# Patient Record
Sex: MALE | Race: WHITE | Employment: UNEMPLOYED | URBAN - METROPOLITAN AREA
[De-identification: names, ages, dates, MRNs, and addresses within clinical notes are randomized per-mention and may not be internally consistent; named-entity substitution may affect disease eponyms.]

---

## 2017-01-04 ENCOUNTER — TELEPHONE (OUTPATIENT)
Dept: ORTHOPEDIC SURGERY | Age: 42
End: 2017-01-04

## 2017-02-02 ENCOUNTER — HOSPITAL ENCOUNTER (OUTPATIENT)
Dept: MRI IMAGING | Age: 42
Discharge: HOME | End: 2017-02-02
Attending: NURSE PRACTITIONER | Admitting: NURSE PRACTITIONER

## 2017-02-02 DIAGNOSIS — R51.9 HEADACHE: ICD-10-CM

## 2017-02-02 DIAGNOSIS — R51.9 WORSENING HEADACHES: ICD-10-CM

## 2017-05-24 ENCOUNTER — OFFICE VISIT (OUTPATIENT)
Dept: URGENT CARE | Age: 42
End: 2017-05-24

## 2017-05-24 VITALS
SYSTOLIC BLOOD PRESSURE: 150 MMHG | HEART RATE: 64 BPM | BODY MASS INDEX: 28 KG/M2 | RESPIRATION RATE: 16 BRPM | HEIGHT: 71 IN | DIASTOLIC BLOOD PRESSURE: 108 MMHG | TEMPERATURE: 97.5 F | WEIGHT: 200 LBS

## 2017-05-24 DIAGNOSIS — Z76.5 DRUG-SEEKING BEHAVIOR: ICD-10-CM

## 2017-05-24 DIAGNOSIS — M25.511 ACUTE PAIN OF RIGHT SHOULDER: Primary | ICD-10-CM

## 2017-05-24 PROCEDURE — 99214 OFFICE O/P EST MOD 30 MIN: CPT | Performed by: EMERGENCY MEDICINE

## 2017-05-24 ASSESSMENT — ENCOUNTER SYMPTOMS: COLOR CHANGE: 0

## 2017-09-09 ENCOUNTER — HOSPITAL ENCOUNTER (OUTPATIENT)
Dept: OTHER | Age: 42
Discharge: HOME | End: 2017-09-09
Attending: NURSE PRACTITIONER | Admitting: NURSE PRACTITIONER

## 2017-09-09 DIAGNOSIS — S99.922A INJURY OF FOOT, LEFT, INITIAL ENCOUNTER: ICD-10-CM

## 2017-12-06 ENCOUNTER — OFFICE VISIT (OUTPATIENT)
Dept: ORTHOPEDIC SURGERY | Age: 42
End: 2017-12-06

## 2017-12-06 VITALS
WEIGHT: 184.97 LBS | DIASTOLIC BLOOD PRESSURE: 88 MMHG | BODY MASS INDEX: 26.48 KG/M2 | HEART RATE: 110 BPM | SYSTOLIC BLOOD PRESSURE: 117 MMHG | HEIGHT: 70 IN

## 2017-12-06 DIAGNOSIS — S62.212D CLOSED BENNETT'S FRACTURE OF LEFT THUMB WITH ROUTINE HEALING: ICD-10-CM

## 2017-12-06 PROCEDURE — 99214 OFFICE O/P EST MOD 30 MIN: CPT | Performed by: ORTHOPAEDIC SURGERY

## 2017-12-06 PROCEDURE — G8419 CALC BMI OUT NRM PARAM NOF/U: HCPCS | Performed by: ORTHOPAEDIC SURGERY

## 2017-12-06 PROCEDURE — G8484 FLU IMMUNIZE NO ADMIN: HCPCS | Performed by: ORTHOPAEDIC SURGERY

## 2017-12-06 PROCEDURE — G8427 DOCREV CUR MEDS BY ELIG CLIN: HCPCS | Performed by: ORTHOPAEDIC SURGERY

## 2017-12-06 PROCEDURE — 4004F PT TOBACCO SCREEN RCVD TLK: CPT | Performed by: ORTHOPAEDIC SURGERY

## 2017-12-06 NOTE — LETTER
[place] [address]  Phone: [phone]  Fax: [phone]    Richy Singh MD        December 6, 2017     Patient: Erika Franklin   YOB: 1975   Date of Visit: 12/6/2017       To Whom It May Concern: It is my medical opinion that Renate Myles may return to light duty immediately with the following restrictions: no use of left hand for 6 weeks. If you have any questions or concerns, please don't hesitate to call.     Sincerely,        Richy Singh MD

## 2017-12-07 ENCOUNTER — TELEPHONE (OUTPATIENT)
Dept: ORTHOPEDIC SURGERY | Age: 42
End: 2017-12-07

## 2017-12-12 ENCOUNTER — HOSPITAL ENCOUNTER (OUTPATIENT)
Dept: SURGERY | Age: 42
Discharge: HOME | End: 2017-12-12
Attending: ORTHOPAEDIC SURGERY | Admitting: ORTHOPAEDIC SURGERY

## 2017-12-12 VITALS
HEART RATE: 59 BPM | OXYGEN SATURATION: 97 % | DIASTOLIC BLOOD PRESSURE: 96 MMHG | WEIGHT: 184 LBS | BODY MASS INDEX: 26.34 KG/M2 | TEMPERATURE: 97.2 F | HEIGHT: 70 IN | RESPIRATION RATE: 16 BRPM | SYSTOLIC BLOOD PRESSURE: 175 MMHG

## 2017-12-12 RX ORDER — LABETALOL HYDROCHLORIDE 5 MG/ML
5 INJECTION, SOLUTION INTRAVENOUS EVERY 10 MIN PRN
Status: DISCONTINUED | OUTPATIENT
Start: 2017-12-12 | End: 2017-12-13 | Stop reason: HOSPADM

## 2017-12-12 RX ORDER — HYDRALAZINE HYDROCHLORIDE 20 MG/ML
5 INJECTION INTRAMUSCULAR; INTRAVENOUS EVERY 10 MIN PRN
Status: DISCONTINUED | OUTPATIENT
Start: 2017-12-12 | End: 2017-12-13 | Stop reason: HOSPADM

## 2017-12-12 RX ORDER — OXYCODONE HYDROCHLORIDE AND ACETAMINOPHEN 5; 325 MG/1; MG/1
TABLET ORAL
Status: DISCONTINUED
Start: 2017-12-12 | End: 2017-12-13 | Stop reason: HOSPADM

## 2017-12-12 RX ORDER — SODIUM CHLORIDE, SODIUM LACTATE, POTASSIUM CHLORIDE, CALCIUM CHLORIDE 600; 310; 30; 20 MG/100ML; MG/100ML; MG/100ML; MG/100ML
INJECTION, SOLUTION INTRAVENOUS CONTINUOUS
Status: DISCONTINUED | OUTPATIENT
Start: 2017-12-12 | End: 2017-12-13 | Stop reason: HOSPADM

## 2017-12-12 RX ORDER — OXYCODONE HYDROCHLORIDE AND ACETAMINOPHEN 5; 325 MG/1; MG/1
1 TABLET ORAL ONCE
Status: COMPLETED | OUTPATIENT
Start: 2017-12-12 | End: 2017-12-12

## 2017-12-12 RX ORDER — ONDANSETRON 2 MG/ML
4 INJECTION INTRAMUSCULAR; INTRAVENOUS EVERY 10 MIN PRN
Status: DISCONTINUED | OUTPATIENT
Start: 2017-12-12 | End: 2017-12-13 | Stop reason: HOSPADM

## 2017-12-12 RX ORDER — MEPERIDINE HYDROCHLORIDE 50 MG/ML
12.5 INJECTION INTRAMUSCULAR; INTRAVENOUS; SUBCUTANEOUS EVERY 5 MIN PRN
Status: DISCONTINUED | OUTPATIENT
Start: 2017-12-12 | End: 2017-12-13 | Stop reason: HOSPADM

## 2017-12-12 RX ADMIN — OXYCODONE HYDROCHLORIDE AND ACETAMINOPHEN 1 TABLET: 5; 325 TABLET ORAL at 14:06

## 2017-12-12 ASSESSMENT — PAIN DESCRIPTION - ORIENTATION: ORIENTATION: LEFT

## 2017-12-12 ASSESSMENT — PAIN SCALES - GENERAL
PAINLEVEL_OUTOF10: 5
PAINLEVEL_OUTOF10: 0
PAINLEVEL_OUTOF10: 5

## 2017-12-12 ASSESSMENT — PAIN DESCRIPTION - LOCATION: LOCATION: HAND

## 2017-12-12 NOTE — BRIEF OP NOTE
Brief Postoperative Note    Don Dixon  YOB: 1975  [id number]    Pre-operative Diagnosis: left thumb bennet's fracture    Post-operative Diagnosis: Same    Procedure:  ORIF same  X ray    Anesthesia: General    Surgeons/Assistants: porsha    Estimated Blood Loss: less than 50     Complications: None    Specimens: Was Not Obtained    Findings: as    Electronically signed by Nikkie Grace MD on 12/12/2017 at 3:02 PM

## 2017-12-12 NOTE — ANESTHESIA POST-OP
Postoperative Anesthesia Note    Name:    Pardeep Winter  MRN:      [id number]    Patient Vitals for the past 12 hrs:   BP Temp Temp src Pulse Resp SpO2 Height Weight   12/12/17 1412 (!) 175/96 97.2 °F (36.2 °C) Temporal 59 16 97 % - -   12/12/17 1402 (!) 151/104 - - 64 12 100 % - -   12/12/17 1347 (!) 131/90 97.1 °F (36.2 °C) Temporal 64 12 100 % - -   12/12/17 1342 (!) 144/31 - - 66 14 100 % - -   12/12/17 1337 (!) 140/94 - - 58 12 100 % - -   12/12/17 1332 126/79 97.4 °F (36.3 °C) Temporal 59 12 97 % - -   12/12/17 1103 (!) 144/93 97 °F (36.1 °C) Temporal 60 18 97 % 5' 10\" (1.778 m) 184 lb (83.5 kg)        LABS:    CBC  Lab Results   Component Value Date/Time    WBC 13.5 (H) 01/24/2017 04:00 PM    HGB 17.4 01/24/2017 04:00 PM    HCT 51.7 01/24/2017 04:00 PM     01/24/2017 04:00 PM     RENAL  Lab Results   Component Value Date/Time     01/24/2017 04:00 PM    K 3.9 01/24/2017 04:00 PM     01/24/2017 04:00 PM    CO2 26 01/24/2017 04:00 PM    BUN 11 01/24/2017 04:00 PM    CREATININE 0.7 (L) 01/24/2017 04:00 PM    GLUCOSE 89 01/24/2017 04:00 PM     COAGS  No results found for: PROTIME, INR, APTT    Intake & Output: In: 1200 [P.O.:200; I.V.:1000]  Out: -     Nausea & Vomiting:  No    Level of Consciousness:  Awake    Pain Assessment:  Adequate analgesia    Anesthesia Complications:  No apparent anesthetic complications    SUMMARY      Vital signs stable  OK to discharge from Stage I post anesthesia care.   Care transferred from Anesthesiology department on discharge from perioperative area

## 2017-12-12 NOTE — PROGRESS NOTES
Becoming more agitated-c/o pain left hand-medicated, wants to be discharged-taking fluids and crackers-

## 2017-12-13 NOTE — OP NOTE
[facility]                   NichoSergio nascimento                                  OPERATIVE REPORT    PATIENT NAME: Shawn Clements                      :        1975  MED REC NO:   [id number]                          ROOM:  ACCOUNT NO:   [de-identified]                          ADMIT DATE: 2017  PROVIDER:     Jeri Black MD    DATE OF PROCEDURE:  2017    PREOPERATIVE DIAGNOSIS:  Displaced Bosch's fracture of the left thumb. POSTOPERATIVE DIAGNOSIS:  Displaced Bosch's fracture of the left thumb. OPERATION PERFORMED:  1. Open reduction and internal fixation of Bosch's fracture, left thumb. 2.  Interpretation intraoperative PA, lateral, and oblique x-rays of the  hand. SURGEON:  Jeri Black MD    ANESTHESIA:  General.    COMPLICATIONS:  None. INDICATIONS FOR PROCEDURE:  The patient presented with a slightly displaced  Bosch's fracture of the right thumb. DESCRIPTION OF PROCEDURE:  The patient was placed on the operating table in  the supine position and general anesthetic was placed. The arm was then  prepped with ChloraPrep and draped in a sterile fashion. The arm was  exsanguinated using an Ace bandage and a well-padded tourniquet inflated  about the upper arm to 250 mmHg. A T-shaped incision was then made over  the thumb metacarpal and carpometacarpal joint. Dissection was carried  down carefully protecting branches of the radial sensory nerve. The  extensor tendons were then retracted and the periosteum of the metacarpal  was opened as was the joint capsule on the radial side of the  carpometacarpal joint. Using a combination of longitudinal traction and a  [place] elevator through the fracture site _____ the impacted fragment back  down to a normal position in relation to the shaft of the metacarpal, we  were able to achieve good reduction of the articular surface.   This was  then temporarily

## 2017-12-20 ENCOUNTER — OFFICE VISIT (OUTPATIENT)
Dept: ORTHOPEDIC SURGERY | Age: 42
End: 2017-12-20

## 2017-12-20 ENCOUNTER — HOSPITAL ENCOUNTER (OUTPATIENT)
Dept: OCCUPATIONAL THERAPY | Age: 42
Discharge: HOME | End: 2017-12-31
Admitting: ORTHOPAEDIC SURGERY

## 2017-12-20 DIAGNOSIS — S62.212D CLOSED BENNETT'S FRACTURE OF LEFT THUMB WITH ROUTINE HEALING: Primary | ICD-10-CM

## 2017-12-20 PROCEDURE — 99024 POSTOP FOLLOW-UP VISIT: CPT | Performed by: ORTHOPAEDIC SURGERY

## 2017-12-20 RX ORDER — OXYCODONE HYDROCHLORIDE AND ACETAMINOPHEN 5; 325 MG/1; MG/1
1 TABLET ORAL EVERY 8 HOURS PRN
Qty: 20 TABLET | Refills: 0 | Status: SHIPPED | OUTPATIENT
Start: 2017-12-20 | End: 2017-12-27

## 2017-12-20 NOTE — PLAN OF CARE
equipment, currently off work due to thumb injury  Progress of any previous OT/PT: the patient []has/ [x]has not received OT/PT previously for this diagnosis. Pain: 8/10    Objective Findings as appropriate:  ROM, strength, edema, wound/ scar appearance, function:   Sutures intact over dorsum of left thumb, mild swelling and bruising  Type of splint:   Wrist and thumb spica left hand  Splint protocol utilization:   Full time except for hygiene  Splint Purpose: [x]Immobilize or protect [x]Promote healing of fracture   []Relieve pain  []Provide support for improved hand function []Maximize joint motion    Treatment:   [x]Splint provided ([x]Customized/ []Prefabricated), and splint rationale explained. [x]Patient instructed in [x]wear/ [x]care of splint and educated regarding diagnosis. []Patient instructed in symptom reduction techniques   []HEP instruction    []Discussed ADL assistive device    Written Information Distributed: []HEP  [x]Splint care and wearing protocol    Patient response to evaluation and instructions:  [x]Attentive/interested   [x]Asked questions/ retained info  []Appeared disinterested  []Poor retention of information  []Appeared anxious/ fearful    Assessment and Plan:  Goals: [x]Patient will be able to verbalize rationale for, and demonstrate proper wearing     of splint. [x]Splint will provide proper fit and function. []Patient will be able to verbalize 2-3 ways to prevent further symptoms. [x]Patient will be able to don and doff independently. []Patient will be independent with HEP    Goals met:  [x]yes []no    Plan:  [x]Splint completed with good fit and function. Hand Therapy to follow up for     splint modifications as needed    []Splint completed; OT/PT evaluation initiated. Patient to return for further     treatment.     Robert Colon , [address], [address] [facility]

## 2017-12-20 NOTE — PROGRESS NOTES
Assessment: Anatomic realignment Bosch's fracture left thumb. 1st postop visit    Treatment Plan: Sutures removed today. Patient may start gentle range of motion exercises but no resistance    Return in about 1 month (around 1/20/2018) for X-ray next visit. History of Present Illness  Rashad Lopez is a 36 y.o. male. 1st postop visit    Review of Systems  Complete Review of Systems performed and is non-contributory except for what is noted in HPI. Vital Signs  There were no vitals filed for this visit. There is no height or weight on file to calculate BMI. Physical Exam  Constitutional:  Patient is well-nourished and demonstrates normal hygiene. Mental Status:  Patient is alert and oriented to person, place and time. Skin:  Intact, no rashes or lesions. Hand Examination: Wounds healed nicely sutures removed today.     Additional Comments:     Additional Examinations:  X-Ray Findings:  PA lateral and oblique x-rays of the left thumb show anatomic realignment of the Bosch's fracture all hardware is in appropriate position  Additional Diagnostic Test Findings:    Office Procedures:    Orders Placed This Encounter   Procedures    XR FINGER LEFT (MIN 2 VIEWS)     85031     Order Specific Question:   Reason for exam:     Answer:   Pain    [facility]     Referral Priority:   Routine     Referral Type:   Eval and Treat     Referral Reason:   Specialty Services Required     Requested Specialty:   Occupational Therapy     Number of Visits Requested:   1

## 2017-12-22 ENCOUNTER — TELEPHONE (OUTPATIENT)
Dept: ORTHOPEDIC SURGERY | Age: 42
End: 2017-12-22

## 2018-01-01 ENCOUNTER — HOSPITAL ENCOUNTER (OUTPATIENT)
Dept: OCCUPATIONAL THERAPY | Age: 43
Discharge: HOME | End: 2018-01-31
Attending: ORTHOPAEDIC SURGERY | Admitting: ORTHOPAEDIC SURGERY

## 2018-01-24 ENCOUNTER — OFFICE VISIT (OUTPATIENT)
Dept: ORTHOPEDIC SURGERY | Age: 43
End: 2018-01-24

## 2018-01-24 DIAGNOSIS — S62.212D CLOSED BENNETT'S FRACTURE OF LEFT THUMB WITH ROUTINE HEALING: Primary | ICD-10-CM

## 2018-01-24 PROCEDURE — 99024 POSTOP FOLLOW-UP VISIT: CPT | Performed by: ORTHOPAEDIC SURGERY

## 2018-01-24 NOTE — PROGRESS NOTES
Assessment: Healing Bosch's fracture of the left thumb. Treatment Plan: Patient had already discontinued his own splint several weeks ago and is back to pretty much normal activities. Return if symptoms worsen or fail to improve. History of Present Illness  Catrachita Siu is a 36 y.o. male. Patient returns today 6 weeks post open reduction internal fixation of displaced Bosch's fracture. He states about 2 weeks ago he completely stopped wearing his splint and his thumb feels good. Review of Systems  Complete Review of Systems performed and is non-contributory except for what is noted in HPI. Vital Signs  There were no vitals filed for this visit. There is no height or weight on file to calculate BMI. Physical Exam  Constitutional:  Patient is well-nourished and demonstrates normal hygiene. Mental Status:  Patient is alert and oriented to person, place and time. Skin:  Intact, no rashes or lesions. Hand Examination: He has full range of motion of the thumb minimal tenderness to direct palpation over his hardware. Additional Comments:     Additional Examinations:  X-Ray Findings:  PA lateral and oblique x-rays of the left thumb show congruent alignment of the articular surface. All hardware remains in good position without evidence of loosening.   Additional Diagnostic Test Findings:    Office Procedures:    Orders Placed This Encounter   Procedures    XR FINGER LEFT (MIN 2 VIEWS)     70565     Order Specific Question:   Reason for exam:     Answer:   Pain

## 2018-06-15 ENCOUNTER — TELEPHONE (OUTPATIENT)
Dept: ORTHOPEDIC SURGERY | Age: 43
End: 2018-06-15

## 2019-01-24 ENCOUNTER — APPOINTMENT (OUTPATIENT)
Dept: GENERAL RADIOLOGY | Age: 44
End: 2019-01-24

## 2019-01-24 ENCOUNTER — HOSPITAL ENCOUNTER (EMERGENCY)
Age: 44
Discharge: LEFT BEFORE DISCHARGE | End: 2019-01-24
Attending: EMERGENCY MEDICINE

## 2019-01-24 VITALS
SYSTOLIC BLOOD PRESSURE: 170 MMHG | HEART RATE: 103 BPM | TEMPERATURE: 97.4 F | WEIGHT: 175 LBS | DIASTOLIC BLOOD PRESSURE: 115 MMHG | RESPIRATION RATE: 16 BRPM | BODY MASS INDEX: 26.52 KG/M2 | OXYGEN SATURATION: 100 % | HEIGHT: 68 IN

## 2019-01-24 DIAGNOSIS — R06.00 DYSPNEA, UNSPECIFIED TYPE: Primary | ICD-10-CM

## 2019-01-24 LAB
A/G RATIO: 1.4 (ref 1.1–2.2)
ACETAMINOPHEN LEVEL: <5 UG/ML (ref 10–30)
ALBUMIN SERPL-MCNC: 4.2 G/DL (ref 3.4–5)
ALP BLD-CCNC: 87 U/L (ref 40–129)
ALT SERPL-CCNC: 25 U/L (ref 10–40)
ANION GAP SERPL CALCULATED.3IONS-SCNC: 14 MMOL/L (ref 3–16)
AST SERPL-CCNC: 22 U/L (ref 15–37)
BASOPHILS ABSOLUTE: 0 K/UL (ref 0–0.2)
BASOPHILS RELATIVE PERCENT: 0.3 %
BILIRUB SERPL-MCNC: 0.5 MG/DL (ref 0–1)
BUN BLDV-MCNC: 9 MG/DL (ref 7–20)
CALCIUM SERPL-MCNC: 9.7 MG/DL (ref 8.3–10.6)
CHLORIDE BLD-SCNC: 98 MMOL/L (ref 99–110)
CO2: 27 MMOL/L (ref 21–32)
CREAT SERPL-MCNC: 0.9 MG/DL (ref 0.9–1.3)
EKG ATRIAL RATE: 105 BPM
EKG DIAGNOSIS: NORMAL
EKG P AXIS: 65 DEGREES
EKG P-R INTERVAL: 176 MS
EKG Q-T INTERVAL: 344 MS
EKG QRS DURATION: 90 MS
EKG QTC CALCULATION (BAZETT): 454 MS
EKG R AXIS: 29 DEGREES
EKG T AXIS: 46 DEGREES
EKG VENTRICULAR RATE: 105 BPM
EOSINOPHILS ABSOLUTE: 0.1 K/UL (ref 0–0.6)
EOSINOPHILS RELATIVE PERCENT: 0.8 %
ETHANOL: NORMAL MG/DL (ref 0–0.08)
GFR AFRICAN AMERICAN: >60
GFR NON-AFRICAN AMERICAN: >60
GLOBULIN: 2.9 G/DL
GLUCOSE BLD-MCNC: 120 MG/DL (ref 70–99)
HCT VFR BLD CALC: 49.4 % (ref 40.5–52.5)
HEMOGLOBIN: 17.1 G/DL (ref 13.5–17.5)
LYMPHOCYTES ABSOLUTE: 1.9 K/UL (ref 1–5.1)
LYMPHOCYTES RELATIVE PERCENT: 14.5 %
MCH RBC QN AUTO: 32.1 PG (ref 26–34)
MCHC RBC AUTO-ENTMCNC: 34.5 G/DL (ref 31–36)
MCV RBC AUTO: 92.9 FL (ref 80–100)
MONOCYTES ABSOLUTE: 1.1 K/UL (ref 0–1.3)
MONOCYTES RELATIVE PERCENT: 8 %
NEUTROPHILS ABSOLUTE: 10.1 K/UL (ref 1.7–7.7)
NEUTROPHILS RELATIVE PERCENT: 76.4 %
PDW BLD-RTO: 13.4 % (ref 12.4–15.4)
PLATELET # BLD: 253 K/UL (ref 135–450)
PMV BLD AUTO: 8.7 FL (ref 5–10.5)
POTASSIUM SERPL-SCNC: 3.7 MMOL/L (ref 3.5–5.1)
RBC # BLD: 5.32 M/UL (ref 4.2–5.9)
SALICYLATE, SERUM: <0.3 MG/DL (ref 15–30)
SODIUM BLD-SCNC: 139 MMOL/L (ref 136–145)
TOTAL PROTEIN: 7.1 G/DL (ref 6.4–8.2)
TROPONIN: <0.01 NG/ML
WBC # BLD: 13.2 K/UL (ref 4–11)

## 2019-01-24 PROCEDURE — 85025 COMPLETE CBC W/AUTO DIFF WBC: CPT

## 2019-01-24 PROCEDURE — 71045 X-RAY EXAM CHEST 1 VIEW: CPT

## 2019-01-24 PROCEDURE — 93005 ELECTROCARDIOGRAM TRACING: CPT | Performed by: EMERGENCY MEDICINE

## 2019-01-24 PROCEDURE — 84484 ASSAY OF TROPONIN QUANT: CPT

## 2019-01-24 PROCEDURE — 99285 EMERGENCY DEPT VISIT HI MDM: CPT

## 2019-01-24 PROCEDURE — 93010 ELECTROCARDIOGRAM REPORT: CPT | Performed by: INTERNAL MEDICINE

## 2019-01-24 PROCEDURE — G0480 DRUG TEST DEF 1-7 CLASSES: HCPCS

## 2019-01-24 PROCEDURE — 80053 COMPREHEN METABOLIC PANEL: CPT

## 2019-01-24 ASSESSMENT — PAIN DESCRIPTION - DESCRIPTORS: DESCRIPTORS: PRESSURE

## 2019-01-24 ASSESSMENT — PAIN SCALES - GENERAL: PAINLEVEL_OUTOF10: 4

## 2019-01-24 ASSESSMENT — PAIN DESCRIPTION - PAIN TYPE: TYPE: ACUTE PAIN

## 2019-01-24 ASSESSMENT — PAIN DESCRIPTION - LOCATION: LOCATION: CHEST

## 2019-03-23 ENCOUNTER — HOSPITAL ENCOUNTER (EMERGENCY)
Age: 44
Discharge: HOME | End: 2019-03-23
Attending: EMERGENCY MEDICINE

## 2019-03-23 VITALS
SYSTOLIC BLOOD PRESSURE: 160 MMHG | HEIGHT: 70 IN | DIASTOLIC BLOOD PRESSURE: 96 MMHG | TEMPERATURE: 97.1 F | BODY MASS INDEX: 25.05 KG/M2 | OXYGEN SATURATION: 98 % | WEIGHT: 175 LBS | HEART RATE: 96 BPM | RESPIRATION RATE: 17 BRPM

## 2019-03-23 DIAGNOSIS — F15.10 METHAMPHETAMINE ABUSE (HCC): Primary | ICD-10-CM

## 2019-03-23 LAB
AMPHETAMINE SCREEN, URINE: POSITIVE
BARBITURATE SCREEN URINE: ABNORMAL
BENZODIAZEPINE SCREEN, URINE: ABNORMAL
CANNABINOID SCREEN URINE: POSITIVE
COCAINE METABOLITE SCREEN URINE: ABNORMAL
Lab: ABNORMAL
METHADONE SCREEN, URINE: ABNORMAL
OPIATE SCREEN URINE: ABNORMAL
OXYCODONE URINE: ABNORMAL
PH UA: 6
PHENCYCLIDINE SCREEN URINE: ABNORMAL
PROPOXYPHENE SCREEN: ABNORMAL

## 2019-03-23 PROCEDURE — 99285 EMERGENCY DEPT VISIT HI MDM: CPT

## 2019-03-23 PROCEDURE — 80307 DRUG TEST PRSMV CHEM ANLYZR: CPT

## 2019-03-23 PROCEDURE — 6370000000 HC RX 637 (ALT 250 FOR IP): Performed by: EMERGENCY MEDICINE

## 2019-03-23 RX ORDER — OLANZAPINE 5 MG/1
10 TABLET, ORALLY DISINTEGRATING ORAL ONCE
Status: COMPLETED | OUTPATIENT
Start: 2019-03-23 | End: 2019-03-23

## 2019-03-23 RX ORDER — OLANZAPINE 5 MG/1
10 TABLET, ORALLY DISINTEGRATING ORAL NIGHTLY
Status: DISCONTINUED | OUTPATIENT
Start: 2019-03-23 | End: 2019-03-23

## 2019-03-23 RX ORDER — OLANZAPINE 10 MG/1
10 TABLET, ORALLY DISINTEGRATING ORAL NIGHTLY
Qty: 30 TABLET | Refills: 0 | Status: SHIPPED | OUTPATIENT
Start: 2019-03-23 | End: 2019-11-09

## 2019-03-23 RX ADMIN — OLANZAPINE 10 MG: 5 TABLET, ORALLY DISINTEGRATING ORAL at 23:37

## 2019-03-23 ASSESSMENT — PAIN SCALES - GENERAL: PAINLEVEL_OUTOF10: 3

## 2019-11-09 ENCOUNTER — HOSPITAL ENCOUNTER (EMERGENCY)
Age: 44
Discharge: HOME | End: 2019-11-10
Attending: EMERGENCY MEDICINE
Payer: MEDICAID

## 2019-11-09 DIAGNOSIS — F22 PARANOIA (HCC): ICD-10-CM

## 2019-11-09 DIAGNOSIS — F19.10 SUBSTANCE ABUSE (HCC): Primary | ICD-10-CM

## 2019-11-09 LAB
A/G RATIO: 1.5 (ref 1.1–2.2)
ALBUMIN SERPL-MCNC: 4.4 G/DL (ref 3.4–5)
ALP BLD-CCNC: 77 U/L (ref 40–129)
ALT SERPL-CCNC: 23 U/L (ref 10–40)
ANION GAP SERPL CALCULATED.3IONS-SCNC: 16 MMOL/L (ref 3–16)
AST SERPL-CCNC: 34 U/L (ref 15–37)
BASOPHILS ABSOLUTE: 0.1 K/UL (ref 0–0.2)
BASOPHILS RELATIVE PERCENT: 0.5 %
BILIRUB SERPL-MCNC: 1.1 MG/DL (ref 0–1)
BUN BLDV-MCNC: 16 MG/DL (ref 7–20)
CALCIUM SERPL-MCNC: 9.8 MG/DL (ref 8.3–10.6)
CHLORIDE BLD-SCNC: 96 MMOL/L (ref 99–110)
CO2: 28 MMOL/L (ref 21–32)
CREAT SERPL-MCNC: 0.9 MG/DL (ref 0.9–1.3)
EOSINOPHILS ABSOLUTE: 0 K/UL (ref 0–0.6)
EOSINOPHILS RELATIVE PERCENT: 0.1 %
GFR AFRICAN AMERICAN: >60
GFR NON-AFRICAN AMERICAN: >60
GLOBULIN: 2.9 G/DL
GLUCOSE BLD-MCNC: 86 MG/DL (ref 70–99)
HCT VFR BLD CALC: 48.1 % (ref 40.5–52.5)
HEMOGLOBIN: 16.4 G/DL (ref 13.5–17.5)
LIPASE: 12 U/L (ref 13–60)
LYMPHOCYTES ABSOLUTE: 1.6 K/UL (ref 1–5.1)
LYMPHOCYTES RELATIVE PERCENT: 7.8 %
MCH RBC QN AUTO: 32.1 PG (ref 26–34)
MCHC RBC AUTO-ENTMCNC: 34.2 G/DL (ref 31–36)
MCV RBC AUTO: 93.9 FL (ref 80–100)
MONOCYTES ABSOLUTE: 1.3 K/UL (ref 0–1.3)
MONOCYTES RELATIVE PERCENT: 6.5 %
NEUTROPHILS ABSOLUTE: 17.8 K/UL (ref 1.7–7.7)
NEUTROPHILS RELATIVE PERCENT: 85.1 %
PDW BLD-RTO: 12.9 % (ref 12.4–15.4)
PLATELET # BLD: 235 K/UL (ref 135–450)
PMV BLD AUTO: 9.1 FL (ref 5–10.5)
POTASSIUM REFLEX MAGNESIUM: 5 MMOL/L (ref 3.5–5.1)
RBC # BLD: 5.13 M/UL (ref 4.2–5.9)
SODIUM BLD-SCNC: 140 MMOL/L (ref 136–145)
TOTAL PROTEIN: 7.3 G/DL (ref 6.4–8.2)
WBC # BLD: 20.9 K/UL (ref 4–11)

## 2019-11-09 PROCEDURE — G0480 DRUG TEST DEF 1-7 CLASSES: HCPCS

## 2019-11-09 PROCEDURE — 80307 DRUG TEST PRSMV CHEM ANLYZR: CPT

## 2019-11-09 PROCEDURE — 80053 COMPREHEN METABOLIC PANEL: CPT

## 2019-11-09 PROCEDURE — 85025 COMPLETE CBC W/AUTO DIFF WBC: CPT

## 2019-11-09 PROCEDURE — 81001 URINALYSIS AUTO W/SCOPE: CPT

## 2019-11-09 PROCEDURE — 83690 ASSAY OF LIPASE: CPT

## 2019-11-09 PROCEDURE — 93005 ELECTROCARDIOGRAM TRACING: CPT | Performed by: EMERGENCY MEDICINE

## 2019-11-09 PROCEDURE — 99285 EMERGENCY DEPT VISIT HI MDM: CPT

## 2019-11-10 ENCOUNTER — APPOINTMENT (OUTPATIENT)
Dept: GENERAL RADIOLOGY | Age: 44
End: 2019-11-10
Payer: MEDICAID

## 2019-11-10 VITALS
TEMPERATURE: 98.6 F | BODY MASS INDEX: 24.25 KG/M2 | HEIGHT: 68 IN | SYSTOLIC BLOOD PRESSURE: 118 MMHG | OXYGEN SATURATION: 98 % | RESPIRATION RATE: 18 BRPM | WEIGHT: 160 LBS | DIASTOLIC BLOOD PRESSURE: 74 MMHG | HEART RATE: 87 BPM

## 2019-11-10 LAB
ACETAMINOPHEN LEVEL: <5 UG/ML (ref 10–30)
AMPHETAMINE SCREEN, URINE: POSITIVE
BACTERIA: ABNORMAL /HPF
BARBITURATE SCREEN URINE: ABNORMAL
BENZODIAZEPINE SCREEN, URINE: ABNORMAL
BILIRUBIN URINE: ABNORMAL
BLOOD, URINE: NEGATIVE
CANNABINOID SCREEN URINE: ABNORMAL
CLARITY: CLEAR
COCAINE METABOLITE SCREEN URINE: ABNORMAL
COLOR: YELLOW
EKG ATRIAL RATE: 82 BPM
EKG DIAGNOSIS: NORMAL
EKG P AXIS: 70 DEGREES
EKG P-R INTERVAL: 144 MS
EKG Q-T INTERVAL: 390 MS
EKG QRS DURATION: 90 MS
EKG QTC CALCULATION (BAZETT): 455 MS
EKG R AXIS: 18 DEGREES
EKG T AXIS: 29 DEGREES
EKG VENTRICULAR RATE: 82 BPM
ETHANOL: NORMAL MG/DL (ref 0–0.08)
GLUCOSE URINE: NEGATIVE MG/DL
KETONES, URINE: 40 MG/DL
LEUKOCYTE ESTERASE, URINE: NEGATIVE
Lab: ABNORMAL
METHADONE SCREEN, URINE: ABNORMAL
MICROSCOPIC EXAMINATION: YES
MUCUS: ABNORMAL /LPF
NITRITE, URINE: NEGATIVE
OPIATE SCREEN URINE: ABNORMAL
OXYCODONE URINE: ABNORMAL
PH UA: 7
PH UA: 7 (ref 5–8)
PHENCYCLIDINE SCREEN URINE: ABNORMAL
PROPOXYPHENE SCREEN: ABNORMAL
PROTEIN UA: 30 MG/DL
RBC UA: ABNORMAL /HPF (ref 0–2)
SALICYLATE, SERUM: <0.3 MG/DL (ref 15–30)
SPECIFIC GRAVITY UA: 1.01 (ref 1–1.03)
URINE TYPE: ABNORMAL
UROBILINOGEN, URINE: 0.2 E.U./DL
WBC UA: ABNORMAL /HPF (ref 0–5)

## 2019-11-10 PROCEDURE — 93010 ELECTROCARDIOGRAM REPORT: CPT | Performed by: INTERNAL MEDICINE

## 2019-11-10 PROCEDURE — 71046 X-RAY EXAM CHEST 2 VIEWS: CPT

## 2019-11-10 PROCEDURE — 6370000000 HC RX 637 (ALT 250 FOR IP): Performed by: EMERGENCY MEDICINE

## 2019-11-10 RX ORDER — GABAPENTIN 300 MG/1
300 CAPSULE ORAL ONCE
Status: COMPLETED | OUTPATIENT
Start: 2019-11-10 | End: 2019-11-10

## 2019-11-10 RX ORDER — NICOTINE 21 MG/24HR
1 PATCH, TRANSDERMAL 24 HOURS TRANSDERMAL ONCE
Status: DISCONTINUED | OUTPATIENT
Start: 2019-11-10 | End: 2019-11-10 | Stop reason: HOSPADM

## 2019-11-10 RX ORDER — ACETAMINOPHEN 500 MG
1000 TABLET ORAL ONCE
Status: COMPLETED | OUTPATIENT
Start: 2019-11-10 | End: 2019-11-10

## 2019-11-10 RX ADMIN — GABAPENTIN 300 MG: 300 CAPSULE ORAL at 01:37

## 2019-11-10 RX ADMIN — ACETAMINOPHEN 1000 MG: 500 TABLET ORAL at 01:34

## 2019-11-10 ASSESSMENT — ENCOUNTER SYMPTOMS
COUGH: 0
SHORTNESS OF BREATH: 0
DIARRHEA: 0
BACK PAIN: 0
NAUSEA: 0
PHOTOPHOBIA: 0
VOMITING: 0
ABDOMINAL PAIN: 0
WHEEZING: 0
RHINORRHEA: 0

## 2019-11-10 ASSESSMENT — PAIN DESCRIPTION - LOCATION: LOCATION: FOOT

## 2019-11-10 ASSESSMENT — PAIN SCALES - GENERAL
PAINLEVEL_OUTOF10: 4
PAINLEVEL_OUTOF10: 10

## 2019-11-10 ASSESSMENT — PAIN DESCRIPTION - ORIENTATION: ORIENTATION: LEFT;RIGHT

## 2019-11-10 ASSESSMENT — PAIN DESCRIPTION - PAIN TYPE: TYPE: CHRONIC PAIN

## 2019-11-26 ENCOUNTER — HOSPITAL ENCOUNTER (INPATIENT)
Age: 44
LOS: 3 days | Discharge: HOME | DRG: 342 | End: 2019-11-29
Attending: EMERGENCY MEDICINE | Admitting: INTERNAL MEDICINE
Payer: MEDICAID

## 2019-11-26 ENCOUNTER — APPOINTMENT (OUTPATIENT)
Dept: GENERAL RADIOLOGY | Age: 44
DRG: 342 | End: 2019-11-26
Payer: MEDICAID

## 2019-11-26 DIAGNOSIS — M86.9 OSTEOMYELITIS OF LEFT FOOT, UNSPECIFIED TYPE (HCC): Primary | ICD-10-CM

## 2019-11-26 DIAGNOSIS — L03.119 CELLULITIS OF FOOT: ICD-10-CM

## 2019-11-26 PROBLEM — M86.679 CHRONIC OSTEOMYELITIS OF FOOT (HCC): Status: ACTIVE | Noted: 2019-11-26

## 2019-11-26 LAB
ANION GAP SERPL CALCULATED.3IONS-SCNC: 15 MMOL/L (ref 3–16)
BASOPHILS ABSOLUTE: 0.1 K/UL (ref 0–0.2)
BASOPHILS RELATIVE PERCENT: 0.6 %
BUN BLDV-MCNC: 24 MG/DL (ref 7–20)
CALCIUM SERPL-MCNC: 9.1 MG/DL (ref 8.3–10.6)
CHLORIDE BLD-SCNC: 103 MMOL/L (ref 99–110)
CO2: 22 MMOL/L (ref 21–32)
CREAT SERPL-MCNC: 0.6 MG/DL (ref 0.9–1.3)
EOSINOPHILS ABSOLUTE: 0.2 K/UL (ref 0–0.6)
EOSINOPHILS RELATIVE PERCENT: 1.4 %
GFR AFRICAN AMERICAN: >60
GFR NON-AFRICAN AMERICAN: >60
GLUCOSE BLD-MCNC: 91 MG/DL (ref 70–99)
HCT VFR BLD CALC: 47.1 % (ref 40.5–52.5)
HEMOGLOBIN: 15.9 G/DL (ref 13.5–17.5)
LYMPHOCYTES ABSOLUTE: 3.3 K/UL (ref 1–5.1)
LYMPHOCYTES RELATIVE PERCENT: 29.6 %
MCH RBC QN AUTO: 31.9 PG (ref 26–34)
MCHC RBC AUTO-ENTMCNC: 33.7 G/DL (ref 31–36)
MCV RBC AUTO: 94.7 FL (ref 80–100)
MONOCYTES ABSOLUTE: 0.9 K/UL (ref 0–1.3)
MONOCYTES RELATIVE PERCENT: 8 %
NEUTROPHILS ABSOLUTE: 6.8 K/UL (ref 1.7–7.7)
NEUTROPHILS RELATIVE PERCENT: 60.4 %
PDW BLD-RTO: 13.6 % (ref 12.4–15.4)
PLATELET # BLD: 327 K/UL (ref 135–450)
PMV BLD AUTO: 7.8 FL (ref 5–10.5)
POTASSIUM SERPL-SCNC: 4 MMOL/L (ref 3.5–5.1)
RBC # BLD: 4.97 M/UL (ref 4.2–5.9)
SODIUM BLD-SCNC: 140 MMOL/L (ref 136–145)
WBC # BLD: 11.2 K/UL (ref 4–11)

## 2019-11-26 PROCEDURE — G0378 HOSPITAL OBSERVATION PER HR: HCPCS

## 2019-11-26 PROCEDURE — 85025 COMPLETE CBC W/AUTO DIFF WBC: CPT

## 2019-11-26 PROCEDURE — 86140 C-REACTIVE PROTEIN: CPT

## 2019-11-26 PROCEDURE — 73630 X-RAY EXAM OF FOOT: CPT

## 2019-11-26 PROCEDURE — 1200000000 HC SEMI PRIVATE

## 2019-11-26 PROCEDURE — 80048 BASIC METABOLIC PNL TOTAL CA: CPT

## 2019-11-26 PROCEDURE — 99285 EMERGENCY DEPT VISIT HI MDM: CPT

## 2019-11-26 RX ORDER — VANCOMYCIN HYDROCHLORIDE 1 G/200ML
1000 INJECTION, SOLUTION INTRAVENOUS ONCE
Status: DISCONTINUED | OUTPATIENT
Start: 2019-11-26 | End: 2019-11-26

## 2019-11-26 ASSESSMENT — PAIN DESCRIPTION - PAIN TYPE: TYPE: CHRONIC PAIN;ACUTE PAIN

## 2019-11-26 ASSESSMENT — ENCOUNTER SYMPTOMS
DIARRHEA: 0
COUGH: 0
WHEEZING: 0
ABDOMINAL PAIN: 0
VOMITING: 0
SHORTNESS OF BREATH: 0
RHINORRHEA: 0
NAUSEA: 0

## 2019-11-26 ASSESSMENT — PAIN DESCRIPTION - LOCATION: LOCATION: FOOT

## 2019-11-26 ASSESSMENT — PAIN SCALES - GENERAL: PAINLEVEL_OUTOF10: 8

## 2019-11-26 ASSESSMENT — PAIN DESCRIPTION - ORIENTATION: ORIENTATION: LEFT;RIGHT

## 2019-11-27 ENCOUNTER — APPOINTMENT (OUTPATIENT)
Dept: GENERAL RADIOLOGY | Age: 44
DRG: 342 | End: 2019-11-27
Payer: MEDICAID

## 2019-11-27 LAB — C-REACTIVE PROTEIN: 6.7 MG/L (ref 0–5.1)

## 2019-11-27 PROCEDURE — 1200000000 HC SEMI PRIVATE

## 2019-11-27 PROCEDURE — 96374 THER/PROPH/DIAG INJ IV PUSH: CPT

## 2019-11-27 PROCEDURE — 96372 THER/PROPH/DIAG INJ SC/IM: CPT

## 2019-11-27 PROCEDURE — 6360000002 HC RX W HCPCS: Performed by: INTERNAL MEDICINE

## 2019-11-27 PROCEDURE — 2580000003 HC RX 258: Performed by: INTERNAL MEDICINE

## 2019-11-27 PROCEDURE — G0378 HOSPITAL OBSERVATION PER HR: HCPCS

## 2019-11-27 PROCEDURE — 73590 X-RAY EXAM OF LOWER LEG: CPT

## 2019-11-27 PROCEDURE — 96376 TX/PRO/DX INJ SAME DRUG ADON: CPT

## 2019-11-27 RX ORDER — MORPHINE SULFATE 4 MG/ML
4 INJECTION, SOLUTION INTRAMUSCULAR; INTRAVENOUS ONCE
Status: COMPLETED | OUTPATIENT
Start: 2019-11-27 | End: 2019-11-27

## 2019-11-27 RX ORDER — ONDANSETRON 2 MG/ML
4 INJECTION INTRAMUSCULAR; INTRAVENOUS EVERY 6 HOURS PRN
Status: DISCONTINUED | OUTPATIENT
Start: 2019-11-27 | End: 2019-11-29 | Stop reason: HOSPADM

## 2019-11-27 RX ORDER — SODIUM CHLORIDE 0.9 % (FLUSH) 0.9 %
10 SYRINGE (ML) INJECTION EVERY 12 HOURS SCHEDULED
Status: DISCONTINUED | OUTPATIENT
Start: 2019-11-27 | End: 2019-11-29 | Stop reason: HOSPADM

## 2019-11-27 RX ORDER — SODIUM CHLORIDE 0.9 % (FLUSH) 0.9 %
10 SYRINGE (ML) INJECTION PRN
Status: DISCONTINUED | OUTPATIENT
Start: 2019-11-27 | End: 2019-11-29 | Stop reason: HOSPADM

## 2019-11-27 RX ORDER — MORPHINE SULFATE 4 MG/ML
4 INJECTION, SOLUTION INTRAMUSCULAR; INTRAVENOUS EVERY 4 HOURS PRN
Status: DISCONTINUED | OUTPATIENT
Start: 2019-11-27 | End: 2019-11-29 | Stop reason: HOSPADM

## 2019-11-27 RX ADMIN — Medication 10 ML: at 20:55

## 2019-11-27 RX ADMIN — MORPHINE SULFATE 4 MG: 4 INJECTION INTRAVENOUS at 16:07

## 2019-11-27 RX ADMIN — Medication 10 ML: at 11:01

## 2019-11-27 RX ADMIN — MORPHINE SULFATE 4 MG: 4 INJECTION INTRAVENOUS at 04:35

## 2019-11-27 RX ADMIN — ENOXAPARIN SODIUM 40 MG: 40 INJECTION SUBCUTANEOUS at 10:59

## 2019-11-27 RX ADMIN — MORPHINE SULFATE 4 MG: 4 INJECTION INTRAVENOUS at 00:59

## 2019-11-27 ASSESSMENT — PAIN DESCRIPTION - LOCATION
LOCATION: TOE (COMMENT WHICH ONE)
LOCATION: FOOT
LOCATION: OTHER (COMMENT);TOE (COMMENT WHICH ONE)
LOCATION: FOOT
LOCATION: TOE (COMMENT WHICH ONE)

## 2019-11-27 ASSESSMENT — PAIN DESCRIPTION - PAIN TYPE
TYPE: CHRONIC PAIN

## 2019-11-27 ASSESSMENT — PAIN DESCRIPTION - FREQUENCY
FREQUENCY: CONTINUOUS

## 2019-11-27 ASSESSMENT — PAIN SCALES - GENERAL
PAINLEVEL_OUTOF10: 8
PAINLEVEL_OUTOF10: 8
PAINLEVEL_OUTOF10: 7
PAINLEVEL_OUTOF10: 8
PAINLEVEL_OUTOF10: 6
PAINLEVEL_OUTOF10: 8
PAINLEVEL_OUTOF10: 10
PAINLEVEL_OUTOF10: 7
PAINLEVEL_OUTOF10: 8
PAINLEVEL_OUTOF10: 7

## 2019-11-27 ASSESSMENT — PAIN DESCRIPTION - ORIENTATION
ORIENTATION: RIGHT;LEFT
ORIENTATION: LEFT;RIGHT
ORIENTATION: RIGHT;LEFT

## 2019-11-27 ASSESSMENT — PAIN DESCRIPTION - ONSET
ONSET: ON-GOING

## 2019-11-27 ASSESSMENT — PAIN DESCRIPTION - PROGRESSION
CLINICAL_PROGRESSION: NOT CHANGED
CLINICAL_PROGRESSION: GRADUALLY IMPROVING
CLINICAL_PROGRESSION: NOT CHANGED

## 2019-11-27 ASSESSMENT — PAIN - FUNCTIONAL ASSESSMENT
PAIN_FUNCTIONAL_ASSESSMENT: PREVENTS OR INTERFERES SOME ACTIVE ACTIVITIES AND ADLS

## 2019-11-27 ASSESSMENT — PAIN DESCRIPTION - DESCRIPTORS
DESCRIPTORS: ACHING
DESCRIPTORS: ACHING;CONSTANT
DESCRIPTORS: ACHING
DESCRIPTORS: ACHING
DESCRIPTORS: ACHING;THROBBING

## 2019-11-28 LAB
ANION GAP SERPL CALCULATED.3IONS-SCNC: 10 MMOL/L (ref 3–16)
BUN BLDV-MCNC: 15 MG/DL (ref 7–20)
CALCIUM SERPL-MCNC: 8.8 MG/DL (ref 8.3–10.6)
CHLORIDE BLD-SCNC: 107 MMOL/L (ref 99–110)
CO2: 23 MMOL/L (ref 21–32)
CREAT SERPL-MCNC: 0.6 MG/DL (ref 0.9–1.3)
GFR AFRICAN AMERICAN: >60
GFR NON-AFRICAN AMERICAN: >60
GLUCOSE BLD-MCNC: 117 MG/DL (ref 70–99)
HCT VFR BLD CALC: 48.4 % (ref 40.5–52.5)
HEMOGLOBIN: 16.5 G/DL (ref 13.5–17.5)
MCH RBC QN AUTO: 32.6 PG (ref 26–34)
MCHC RBC AUTO-ENTMCNC: 34 G/DL (ref 31–36)
MCV RBC AUTO: 96 FL (ref 80–100)
PDW BLD-RTO: 13.7 % (ref 12.4–15.4)
PLATELET # BLD: 266 K/UL (ref 135–450)
PMV BLD AUTO: 7.8 FL (ref 5–10.5)
POTASSIUM SERPL-SCNC: 4.1 MMOL/L (ref 3.5–5.1)
RBC # BLD: 5.05 M/UL (ref 4.2–5.9)
SODIUM BLD-SCNC: 140 MMOL/L (ref 136–145)
WBC # BLD: 8.5 K/UL (ref 4–11)

## 2019-11-28 PROCEDURE — 96376 TX/PRO/DX INJ SAME DRUG ADON: CPT

## 2019-11-28 PROCEDURE — 80048 BASIC METABOLIC PNL TOTAL CA: CPT

## 2019-11-28 PROCEDURE — 85027 COMPLETE CBC AUTOMATED: CPT

## 2019-11-28 PROCEDURE — 6370000000 HC RX 637 (ALT 250 FOR IP): Performed by: INTERNAL MEDICINE

## 2019-11-28 PROCEDURE — 6360000002 HC RX W HCPCS: Performed by: INTERNAL MEDICINE

## 2019-11-28 PROCEDURE — 96375 TX/PRO/DX INJ NEW DRUG ADDON: CPT

## 2019-11-28 PROCEDURE — G0378 HOSPITAL OBSERVATION PER HR: HCPCS

## 2019-11-28 PROCEDURE — 1200000000 HC SEMI PRIVATE

## 2019-11-28 PROCEDURE — 2580000003 HC RX 258: Performed by: INTERNAL MEDICINE

## 2019-11-28 RX ORDER — OXYCODONE HYDROCHLORIDE AND ACETAMINOPHEN 5; 325 MG/1; MG/1
2 TABLET ORAL EVERY 6 HOURS PRN
Status: DISCONTINUED | OUTPATIENT
Start: 2019-11-28 | End: 2019-11-29 | Stop reason: HOSPADM

## 2019-11-28 RX ORDER — OXYCODONE HYDROCHLORIDE AND ACETAMINOPHEN 5; 325 MG/1; MG/1
1 TABLET ORAL EVERY 6 HOURS PRN
Status: DISCONTINUED | OUTPATIENT
Start: 2019-11-28 | End: 2019-11-29 | Stop reason: HOSPADM

## 2019-11-28 RX ORDER — OXYCODONE HYDROCHLORIDE AND ACETAMINOPHEN 5; 325 MG/1; MG/1
2 TABLET ORAL EVERY 4 HOURS PRN
Status: DISCONTINUED | OUTPATIENT
Start: 2019-11-28 | End: 2019-11-28

## 2019-11-28 RX ADMIN — MORPHINE SULFATE 4 MG: 4 INJECTION INTRAVENOUS at 06:03

## 2019-11-28 RX ADMIN — OXYCODONE HYDROCHLORIDE AND ACETAMINOPHEN 1 TABLET: 5; 325 TABLET ORAL at 17:39

## 2019-11-28 RX ADMIN — ONDANSETRON 4 MG: 2 INJECTION INTRAMUSCULAR; INTRAVENOUS at 12:45

## 2019-11-28 RX ADMIN — Medication 10 ML: at 10:07

## 2019-11-28 RX ADMIN — Medication 10 ML: at 22:43

## 2019-11-28 RX ADMIN — Medication 10 ML: at 12:39

## 2019-11-28 RX ADMIN — MORPHINE SULFATE 4 MG: 4 INJECTION INTRAVENOUS at 12:38

## 2019-11-28 ASSESSMENT — PAIN DESCRIPTION - LOCATION
LOCATION: FOOT

## 2019-11-28 ASSESSMENT — PAIN DESCRIPTION - PROGRESSION
CLINICAL_PROGRESSION: NOT CHANGED

## 2019-11-28 ASSESSMENT — PAIN SCALES - GENERAL
PAINLEVEL_OUTOF10: 6
PAINLEVEL_OUTOF10: 7
PAINLEVEL_OUTOF10: 8
PAINLEVEL_OUTOF10: 8
PAINLEVEL_OUTOF10: 7
PAINLEVEL_OUTOF10: 7
PAINLEVEL_OUTOF10: 5
PAINLEVEL_OUTOF10: 0

## 2019-11-28 ASSESSMENT — PAIN DESCRIPTION - ORIENTATION
ORIENTATION: RIGHT;LEFT

## 2019-11-28 ASSESSMENT — PAIN DESCRIPTION - PAIN TYPE
TYPE: CHRONIC PAIN

## 2019-11-28 ASSESSMENT — PAIN DESCRIPTION - FREQUENCY
FREQUENCY: CONTINUOUS

## 2019-11-28 ASSESSMENT — PAIN DESCRIPTION - DESCRIPTORS
DESCRIPTORS: ACHING;CONSTANT

## 2019-11-28 ASSESSMENT — PAIN - FUNCTIONAL ASSESSMENT
PAIN_FUNCTIONAL_ASSESSMENT: PREVENTS OR INTERFERES SOME ACTIVE ACTIVITIES AND ADLS
PAIN_FUNCTIONAL_ASSESSMENT: PREVENTS OR INTERFERES SOME ACTIVE ACTIVITIES AND ADLS

## 2019-11-28 ASSESSMENT — PAIN DESCRIPTION - ONSET
ONSET: ON-GOING

## 2019-11-29 VITALS
OXYGEN SATURATION: 95 % | DIASTOLIC BLOOD PRESSURE: 76 MMHG | WEIGHT: 159.8 LBS | HEIGHT: 70 IN | BODY MASS INDEX: 22.88 KG/M2 | SYSTOLIC BLOOD PRESSURE: 137 MMHG | HEART RATE: 68 BPM | RESPIRATION RATE: 16 BRPM | TEMPERATURE: 97.9 F

## 2019-11-29 PROBLEM — M79.674 PAIN AND SWELLING OF TOE OF RIGHT FOOT: Status: ACTIVE | Noted: 2019-11-29

## 2019-11-29 PROBLEM — S92.502D: Status: ACTIVE | Noted: 2019-11-29

## 2019-11-29 PROBLEM — S92.414D CLOSED NONDISPLACED FRACTURE OF PROXIMAL PHALANX OF RIGHT GREAT TOE WITH ROUTINE HEALING: Status: ACTIVE | Noted: 2019-11-29

## 2019-11-29 PROBLEM — M79.89 PAIN AND SWELLING OF TOE OF RIGHT FOOT: Status: ACTIVE | Noted: 2019-11-29

## 2019-11-29 LAB
ANION GAP SERPL CALCULATED.3IONS-SCNC: 10 MMOL/L (ref 3–16)
BUN BLDV-MCNC: 16 MG/DL (ref 7–20)
CALCIUM SERPL-MCNC: 8.7 MG/DL (ref 8.3–10.6)
CHLORIDE BLD-SCNC: 107 MMOL/L (ref 99–110)
CO2: 23 MMOL/L (ref 21–32)
CREAT SERPL-MCNC: 0.7 MG/DL (ref 0.9–1.3)
GFR AFRICAN AMERICAN: >60
GFR NON-AFRICAN AMERICAN: >60
GLUCOSE BLD-MCNC: 98 MG/DL (ref 70–99)
HCT VFR BLD CALC: 47.8 % (ref 40.5–52.5)
HEMOGLOBIN: 15.7 G/DL (ref 13.5–17.5)
MCH RBC QN AUTO: 31.9 PG (ref 26–34)
MCHC RBC AUTO-ENTMCNC: 32.9 G/DL (ref 31–36)
MCV RBC AUTO: 96.8 FL (ref 80–100)
PDW BLD-RTO: 13.9 % (ref 12.4–15.4)
PLATELET # BLD: 243 K/UL (ref 135–450)
PMV BLD AUTO: 8.3 FL (ref 5–10.5)
POTASSIUM SERPL-SCNC: 4.5 MMOL/L (ref 3.5–5.1)
RBC # BLD: 4.94 M/UL (ref 4.2–5.9)
SODIUM BLD-SCNC: 140 MMOL/L (ref 136–145)
WBC # BLD: 6.9 K/UL (ref 4–11)

## 2019-11-29 PROCEDURE — 36415 COLL VENOUS BLD VENIPUNCTURE: CPT

## 2019-11-29 PROCEDURE — 6370000000 HC RX 637 (ALT 250 FOR IP): Performed by: INTERNAL MEDICINE

## 2019-11-29 PROCEDURE — 2580000003 HC RX 258: Performed by: INTERNAL MEDICINE

## 2019-11-29 PROCEDURE — 99254 IP/OBS CNSLTJ NEW/EST MOD 60: CPT | Performed by: INTERNAL MEDICINE

## 2019-11-29 PROCEDURE — 80048 BASIC METABOLIC PNL TOTAL CA: CPT

## 2019-11-29 PROCEDURE — 85027 COMPLETE CBC AUTOMATED: CPT

## 2019-11-29 RX ADMIN — OXYCODONE HYDROCHLORIDE AND ACETAMINOPHEN 2 TABLET: 5; 325 TABLET ORAL at 00:10

## 2019-11-29 RX ADMIN — OXYCODONE HYDROCHLORIDE AND ACETAMINOPHEN 2 TABLET: 5; 325 TABLET ORAL at 08:47

## 2019-11-29 RX ADMIN — Medication 10 ML: at 08:48

## 2019-11-29 RX ADMIN — OXYCODONE HYDROCHLORIDE AND ACETAMINOPHEN 2 TABLET: 5; 325 TABLET ORAL at 19:01

## 2019-11-29 ASSESSMENT — PAIN SCALES - GENERAL
PAINLEVEL_OUTOF10: 8

## 2019-12-02 ENCOUNTER — TELEPHONE (OUTPATIENT)
Dept: FAMILY MEDICINE CLINIC | Age: 44
End: 2019-12-02

## 2020-02-01 ENCOUNTER — HOSPITAL ENCOUNTER (INPATIENT)
Age: 45
LOS: 8 days | Discharge: HOME | DRG: 756 | End: 2020-02-10
Attending: EMERGENCY MEDICINE | Admitting: INTERNAL MEDICINE
Payer: MEDICAID

## 2020-02-01 LAB
A/G RATIO: 2 (ref 1.1–2.2)
ACETAMINOPHEN LEVEL: <5 UG/ML (ref 10–30)
ALBUMIN SERPL-MCNC: 4.5 G/DL (ref 3.4–5)
ALP BLD-CCNC: 95 U/L (ref 40–129)
ALT SERPL-CCNC: 27 U/L (ref 10–40)
AMPHETAMINE SCREEN, URINE: ABNORMAL
ANION GAP SERPL CALCULATED.3IONS-SCNC: 13 MMOL/L (ref 3–16)
AST SERPL-CCNC: 22 U/L (ref 15–37)
BARBITURATE SCREEN URINE: ABNORMAL
BASOPHILS ABSOLUTE: 0.1 K/UL (ref 0–0.2)
BASOPHILS RELATIVE PERCENT: 0.5 %
BENZODIAZEPINE SCREEN, URINE: ABNORMAL
BILIRUB SERPL-MCNC: <0.2 MG/DL (ref 0–1)
BILIRUBIN URINE: NEGATIVE
BLOOD, URINE: NEGATIVE
BUN BLDV-MCNC: 13 MG/DL (ref 7–20)
CALCIUM SERPL-MCNC: 8.9 MG/DL (ref 8.3–10.6)
CANNABINOID SCREEN URINE: POSITIVE
CHLORIDE BLD-SCNC: 102 MMOL/L (ref 99–110)
CLARITY: CLEAR
CO2: 22 MMOL/L (ref 21–32)
COCAINE METABOLITE SCREEN URINE: ABNORMAL
COLOR: YELLOW
CREAT SERPL-MCNC: 0.8 MG/DL (ref 0.9–1.3)
EOSINOPHILS ABSOLUTE: 0.1 K/UL (ref 0–0.6)
EOSINOPHILS RELATIVE PERCENT: 0.9 %
ETHANOL: NORMAL MG/DL (ref 0–0.08)
GFR AFRICAN AMERICAN: >60
GFR NON-AFRICAN AMERICAN: >60
GLOBULIN: 2.3 G/DL
GLUCOSE BLD-MCNC: 98 MG/DL (ref 70–99)
GLUCOSE URINE: NEGATIVE MG/DL
HCT VFR BLD CALC: 49.4 % (ref 40.5–52.5)
HEMOGLOBIN: 16.7 G/DL (ref 13.5–17.5)
KETONES, URINE: NEGATIVE MG/DL
LEUKOCYTE ESTERASE, URINE: NEGATIVE
LYMPHOCYTES ABSOLUTE: 3.3 K/UL (ref 1–5.1)
LYMPHOCYTES RELATIVE PERCENT: 19.8 %
Lab: ABNORMAL
MCH RBC QN AUTO: 32.3 PG (ref 26–34)
MCHC RBC AUTO-ENTMCNC: 33.7 G/DL (ref 31–36)
MCV RBC AUTO: 95.7 FL (ref 80–100)
METHADONE SCREEN, URINE: ABNORMAL
MICROSCOPIC EXAMINATION: NORMAL
MONOCYTES ABSOLUTE: 0.9 K/UL (ref 0–1.3)
MONOCYTES RELATIVE PERCENT: 5.3 %
NEUTROPHILS ABSOLUTE: 12.1 K/UL (ref 1.7–7.7)
NEUTROPHILS RELATIVE PERCENT: 73.5 %
NITRITE, URINE: NEGATIVE
OPIATE SCREEN URINE: ABNORMAL
OXYCODONE URINE: POSITIVE
PDW BLD-RTO: 13.6 % (ref 12.4–15.4)
PH UA: 5
PH UA: 6 (ref 5–8)
PHENCYCLIDINE SCREEN URINE: ABNORMAL
PLATELET # BLD: 239 K/UL (ref 135–450)
PMV BLD AUTO: 9.1 FL (ref 5–10.5)
POTASSIUM SERPL-SCNC: 3.6 MMOL/L (ref 3.5–5.1)
PROPOXYPHENE SCREEN: ABNORMAL
PROTEIN UA: NEGATIVE MG/DL
RBC # BLD: 5.16 M/UL (ref 4.2–5.9)
SALICYLATE, SERUM: <0.3 MG/DL (ref 15–30)
SODIUM BLD-SCNC: 137 MMOL/L (ref 136–145)
SPECIFIC GRAVITY UA: 1.02 (ref 1–1.03)
TOTAL PROTEIN: 6.8 G/DL (ref 6.4–8.2)
URINE REFLEX TO CULTURE: NORMAL
URINE TYPE: NORMAL
UROBILINOGEN, URINE: 0.2 E.U./DL
WBC # BLD: 16.5 K/UL (ref 4–11)

## 2020-02-01 PROCEDURE — G0480 DRUG TEST DEF 1-7 CLASSES: HCPCS

## 2020-02-01 PROCEDURE — 85025 COMPLETE CBC W/AUTO DIFF WBC: CPT

## 2020-02-01 PROCEDURE — 80053 COMPREHEN METABOLIC PANEL: CPT

## 2020-02-01 PROCEDURE — 99285 EMERGENCY DEPT VISIT HI MDM: CPT

## 2020-02-01 PROCEDURE — 80307 DRUG TEST PRSMV CHEM ANLYZR: CPT

## 2020-02-01 PROCEDURE — 81003 URINALYSIS AUTO W/O SCOPE: CPT

## 2020-02-01 ASSESSMENT — PAIN DESCRIPTION - LOCATION: LOCATION: FOOT

## 2020-02-01 ASSESSMENT — PAIN DESCRIPTION - ORIENTATION: ORIENTATION: LEFT

## 2020-02-01 ASSESSMENT — PAIN SCALES - GENERAL: PAINLEVEL_OUTOF10: 6

## 2020-02-02 PROBLEM — F41.9 ANXIETY: Status: ACTIVE | Noted: 2020-02-02

## 2020-02-02 LAB — TSH SERPL DL<=0.05 MIU/L-ACNC: 1.11 UIU/ML (ref 0.27–4.2)

## 2020-02-02 PROCEDURE — 6370000000 HC RX 637 (ALT 250 FOR IP): Performed by: EMERGENCY MEDICINE

## 2020-02-02 PROCEDURE — 99223 1ST HOSP IP/OBS HIGH 75: CPT | Performed by: PSYCHIATRY & NEUROLOGY

## 2020-02-02 PROCEDURE — 99222 1ST HOSP IP/OBS MODERATE 55: CPT | Performed by: PHYSICIAN ASSISTANT

## 2020-02-02 PROCEDURE — 6370000000 HC RX 637 (ALT 250 FOR IP): Performed by: PSYCHIATRY & NEUROLOGY

## 2020-02-02 PROCEDURE — 36415 COLL VENOUS BLD VENIPUNCTURE: CPT

## 2020-02-02 PROCEDURE — 6370000000 HC RX 637 (ALT 250 FOR IP): Performed by: PHYSICIAN ASSISTANT

## 2020-02-02 PROCEDURE — 1240000000 HC EMOTIONAL WELLNESS R&B

## 2020-02-02 PROCEDURE — 84443 ASSAY THYROID STIM HORMONE: CPT

## 2020-02-02 RX ORDER — SUMATRIPTAN 25 MG/1
50 TABLET, FILM COATED ORAL ONCE
Status: COMPLETED | OUTPATIENT
Start: 2020-02-02 | End: 2020-02-02

## 2020-02-02 RX ORDER — IBUPROFEN 400 MG/1
400 TABLET ORAL EVERY 6 HOURS PRN
Status: DISCONTINUED | OUTPATIENT
Start: 2020-02-02 | End: 2020-02-10 | Stop reason: HOSPADM

## 2020-02-02 RX ORDER — ACETAMINOPHEN 325 MG/1
650 TABLET ORAL ONCE
Status: COMPLETED | OUTPATIENT
Start: 2020-02-02 | End: 2020-02-02

## 2020-02-02 RX ORDER — HYDROXYZINE PAMOATE 50 MG/1
50 CAPSULE ORAL 3 TIMES DAILY PRN
Status: DISCONTINUED | OUTPATIENT
Start: 2020-02-02 | End: 2020-02-10 | Stop reason: HOSPADM

## 2020-02-02 RX ORDER — IBUPROFEN 800 MG/1
800 TABLET ORAL ONCE
Status: COMPLETED | OUTPATIENT
Start: 2020-02-02 | End: 2020-02-02

## 2020-02-02 RX ORDER — QUETIAPINE FUMARATE 200 MG/1
200 TABLET, FILM COATED ORAL NIGHTLY
Status: DISCONTINUED | OUTPATIENT
Start: 2020-02-02 | End: 2020-02-02

## 2020-02-02 RX ORDER — ESCITALOPRAM OXALATE 10 MG/1
10 TABLET ORAL DAILY
Status: DISCONTINUED | OUTPATIENT
Start: 2020-02-02 | End: 2020-02-05

## 2020-02-02 RX ORDER — TRAZODONE HYDROCHLORIDE 50 MG/1
50 TABLET ORAL NIGHTLY PRN
Status: DISCONTINUED | OUTPATIENT
Start: 2020-02-02 | End: 2020-02-02

## 2020-02-02 RX ORDER — TRAZODONE HYDROCHLORIDE 50 MG/1
50 TABLET ORAL NIGHTLY PRN
Status: DISCONTINUED | OUTPATIENT
Start: 2020-02-02 | End: 2020-02-10 | Stop reason: HOSPADM

## 2020-02-02 RX ADMIN — ESCITALOPRAM OXALATE 10 MG: 10 TABLET ORAL at 12:04

## 2020-02-02 RX ADMIN — IBUPROFEN 400 MG: 400 TABLET, FILM COATED ORAL at 01:40

## 2020-02-02 RX ADMIN — HYDROXYZINE PAMOATE 50 MG: 50 CAPSULE ORAL at 20:38

## 2020-02-02 RX ADMIN — ACETAMINOPHEN 650 MG: 325 TABLET, FILM COATED ORAL at 12:04

## 2020-02-02 RX ADMIN — SUMATRIPTAN SUCCINATE 50 MG: 25 TABLET ORAL at 16:21

## 2020-02-02 RX ADMIN — IBUPROFEN 800 MG: 800 TABLET, FILM COATED ORAL at 12:04

## 2020-02-02 RX ADMIN — ACETAMINOPHEN 650 MG: 325 TABLET ORAL at 00:14

## 2020-02-02 RX ADMIN — TRAZODONE HYDROCHLORIDE 50 MG: 50 TABLET ORAL at 01:40

## 2020-02-02 RX ADMIN — TRAZODONE HYDROCHLORIDE 50 MG: 50 TABLET ORAL at 20:36

## 2020-02-02 RX ADMIN — IBUPROFEN 400 MG: 400 TABLET, FILM COATED ORAL at 20:36

## 2020-02-02 ASSESSMENT — PAIN SCALES - GENERAL
PAINLEVEL_OUTOF10: 7
PAINLEVEL_OUTOF10: 5
PAINLEVEL_OUTOF10: 6
PAINLEVEL_OUTOF10: 7
PAINLEVEL_OUTOF10: 8

## 2020-02-02 ASSESSMENT — SLEEP AND FATIGUE QUESTIONNAIRES
RESTFUL SLEEP: NO
DO YOU USE A SLEEP AID: NO
DO YOU HAVE DIFFICULTY SLEEPING: YES
AVERAGE NUMBER OF SLEEP HOURS: 3
DO YOU USE A SLEEP AID: NO
DIFFICULTY STAYING ASLEEP: YES
DIFFICULTY STAYING ASLEEP: YES
DIFFICULTY ARISING: NO
SLEEP PATTERN: INSOMNIA;RESTLESSNESS;DIFFICULTY FALLING ASLEEP
DIFFICULTY FALLING ASLEEP: YES
DO YOU HAVE DIFFICULTY SLEEPING: YES
RESTFUL SLEEP: NO
DIFFICULTY FALLING ASLEEP: YES
SLEEP PATTERN: INSOMNIA
DIFFICULTY ARISING: NO

## 2020-02-02 ASSESSMENT — LIFESTYLE VARIABLES
HISTORY_ALCOHOL_USE: NO
HISTORY_ALCOHOL_USE: NO

## 2020-02-02 ASSESSMENT — PAIN DESCRIPTION - ONSET: ONSET: ON-GOING

## 2020-02-02 ASSESSMENT — PAIN DESCRIPTION - LOCATION
LOCATION: KNEE
LOCATION: HEAD

## 2020-02-02 ASSESSMENT — PAIN DESCRIPTION - PAIN TYPE: TYPE: ACUTE PAIN

## 2020-02-02 ASSESSMENT — PAIN DESCRIPTION - ORIENTATION: ORIENTATION: RIGHT;LEFT;POSTERIOR

## 2020-02-02 ASSESSMENT — PAIN DESCRIPTION - FREQUENCY: FREQUENCY: INTERMITTENT

## 2020-02-02 ASSESSMENT — PAIN DESCRIPTION - DESCRIPTORS: DESCRIPTORS: ACHING

## 2020-02-02 ASSESSMENT — PAIN DESCRIPTION - PROGRESSION: CLINICAL_PROGRESSION: GRADUALLY WORSENING

## 2020-02-02 ASSESSMENT — PAIN - FUNCTIONAL ASSESSMENT: PAIN_FUNCTIONAL_ASSESSMENT: ACTIVITIES ARE NOT PREVENTED
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
## 2020-02-02 NOTE — BH NOTE
Pt was able to complete psychosocial assessment, AT/OT and CSSR-S. Pt is flat and need prompting. He reports he is here due to having increased anxiety and does not know why. Pt lives with sister. Denies thoughts of hurting self. He states he does not have a PCP and does not see anyone for MH.     Alie Gamboa, MSW, LSW
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
## 2020-02-02 NOTE — H&P
Hospital Medicine History & Physical      PCP: No primary care provider on file. Date of Admission: 2/1/2020    Date of Service: Pt seen/examined on 2/2/2020    Chief Complaint:    Chief Complaint   Patient presents with    Anxiety     Pt states that he is having high anxiety to the point where he doesn't want to leave his home. History Of Present Illness: The patient is a 38 y.o. male with no significant PMH who presented to [facility] for increasing anxiety. Patient was seen and evaluated in the ED by the ED medical provider, patient was medically cleared for admission to [facility]. This note serves as an admission medical H&P.   PCP: denies  Tobacco Use: 1 ppd  EtOH Use: denies  Illicit Drug Use: cannabis daily     Past Medical History:        Diagnosis Date    Chronic back pain     Hypertension     Pneumonia     Tendonitis     left wrist       Past Surgical History:        Procedure Laterality Date    EYE SURGERY      \"lazy eye\"    FRACTURE SURGERY Left 12/12/2017    orif- Bosch's fracture    NASAL SEPTUM SURGERY      NOSE SURGERY  2014       Medications Prior to Admission:    Prior to Admission medications    Not on File       Allergies:  Topamax [topiramate]; Vicodin [hydrocodone-acetaminophen]; and Moxifloxacin    Social History:  The patient currently lives with family. TOBACCO:   reports that he has been smoking cigarettes. He has a 10.00 pack-year smoking history. He has never used smokeless tobacco.  ETOH:   reports no history of alcohol use.       Family History:   Positive as follows:        Problem Relation Age of Onset    Heart Failure Mother     Heart Disease Mother        REVIEW OF SYSTEMS:     Constitutional: Negative for fever   HENT: Negative for sore throat   Eyes: Negative for redness   Respiratory: Negative  for dyspnea, cough   Cardiovascular: Negative for chest pain   Gastrointestinal: Negative for vomiting, diarrhea   Genitourinary: Negative for hematuria Musculoskeletal: Negative for arthralgias   Skin: Negative for rash   Neurological: Negative for syncope, + migraine HA  Hematological: Negative for adenopathy   Psychiatric/Behavorial: Negative for anxiety    PHYSICAL EXAM:    /76   Pulse 63   Temp 97.6 °F (36.4 °C) (Oral)   Resp 16   Ht 5' 10\" (1.778 m)   Wt 200 lb (90.7 kg)   SpO2 95%   BMI 28.70 kg/m²   Gen: No distress. Alert. Middle aged  male, resting in bed  Eyes: PERRL. No sclera icterus. No conjunctival injection. ENT: No discharge. Pharynx clear. Neck:  Trachea midline. Resp: No accessory muscle use. No crackles. No wheezes. No rhonchi. CV: Regular rate. Regular rhythm. No murmur. No rub. No edema. GI: Non-tender. Non-distended. Normal bowel sounds. No hernia. Skin: Warm and dry. No rash on exposed extremities. M/S: No cyanosis. No clubbing. Neuro: Awake. Grossly nonfocal, CN II-XII intact    Psych: Oriented x 3. No anxiety or agitation.      CBC:   Recent Labs     02/01/20 2045   WBC 16.5*   HGB 16.7   HCT 49.4   MCV 95.7        BMP:   Recent Labs     02/01/20 2045      K 3.6      CO2 22   BUN 13   CREATININE 0.8*     LIVER PROFILE:   Recent Labs     02/01/20 2045   AST 22   ALT 27   BILITOT <0.2   ALKPHOS 95     UA:  Recent Labs     02/01/20 2035   COLORU Yellow   PHUR 6.0  5.0   CLARITYU Clear   SPECGRAV 1.025   LEUKOCYTESUR Negative   UROBILINOGEN 0.2   BILIRUBINUR Negative   BLOODU Negative   GLUCOSEU Negative      U/A:    Lab Results   Component Value Date    COLORU Yellow 02/01/2020    WBCUA 0-2 11/09/2019    RBCUA 0-2 11/09/2019    MUCUS Rare 11/09/2019    BACTERIA 1+ 11/09/2019    CLARITYU Clear 02/01/2020    SPECGRAV 1.025 02/01/2020    LEUKOCYTESUR Negative 02/01/2020    BLOODU Negative 02/01/2020    GLUCOSEU Negative 02/01/2020    AMORPHOUS 2+ 01/24/2017     ASSESSMENT/PLAN:    Anxiety  - cont mgmt per [facility]    Elevated BP  - elevated on arrival  - has since trended down  - trended to normal    Migraine HA  - failed to manage symptoms w/ tylenol  - give 1x dose Sumatriptan    Leukocytosis  - unclear etiology  - UA neg, no c/o cough, GI symptoms, afebrile  - will repeat CBC today    Cannabis Abuse  - UDS +  - counseled cessation     Tobacco Dependence  - Recommended cessation      Pt has no medical complaints at this time. Pt was informed that they may have [facility] contact us should any medical concerns arise during this admission.     Elvi Troncoso PA-C 12:10 PM 2/2/2020

## 2020-02-02 NOTE — ED NOTES
Pt resting quietly in assigned treatment room, eyes closed, respirations even and easy, no signs of distress observed.

## 2020-02-02 NOTE — ED NOTES
Pt laying in bed at this time - appears to be asleep - no signs or symptoms of distress noted - will continue to monitor     [facility], RN  02/01/20 7633

## 2020-02-02 NOTE — ED NOTES
Pt brought back to [facility] - changed into blue gown - urine specimen and blood obtained and sent to lab - patient calm and cooperative      Lopez Capone RN  02/01/20 2121

## 2020-02-02 NOTE — BH NOTE
0630.   Pt given Ibuprofen 400 mg for c/o pain behind each knee that he rated as a 7/10,  and Trazodone 50 mg po at 0140. Pt seemed to have good results with both since he went to sleep fairly soon and slept most of the rest of the night.

## 2020-02-03 LAB
BASOPHILS ABSOLUTE: 0.1 K/UL (ref 0–0.2)
BASOPHILS RELATIVE PERCENT: 0.7 %
CHOLESTEROL, TOTAL: 183 MG/DL (ref 0–199)
EOSINOPHILS ABSOLUTE: 0.2 K/UL (ref 0–0.6)
EOSINOPHILS RELATIVE PERCENT: 2.2 %
HCT VFR BLD CALC: 49.5 % (ref 40.5–52.5)
HDLC SERPL-MCNC: 27 MG/DL (ref 40–60)
HEMOGLOBIN: 16.6 G/DL (ref 13.5–17.5)
LDL CHOLESTEROL CALCULATED: 128 MG/DL
LYMPHOCYTES ABSOLUTE: 2.3 K/UL (ref 1–5.1)
LYMPHOCYTES RELATIVE PERCENT: 31.1 %
MCH RBC QN AUTO: 31.9 PG (ref 26–34)
MCHC RBC AUTO-ENTMCNC: 33.5 G/DL (ref 31–36)
MCV RBC AUTO: 95.3 FL (ref 80–100)
MONOCYTES ABSOLUTE: 0.4 K/UL (ref 0–1.3)
MONOCYTES RELATIVE PERCENT: 5.5 %
NEUTROPHILS ABSOLUTE: 4.5 K/UL (ref 1.7–7.7)
NEUTROPHILS RELATIVE PERCENT: 60.5 %
PDW BLD-RTO: 13.6 % (ref 12.4–15.4)
PLATELET # BLD: 207 K/UL (ref 135–450)
PMV BLD AUTO: 9.5 FL (ref 5–10.5)
RBC # BLD: 5.19 M/UL (ref 4.2–5.9)
TRIGL SERPL-MCNC: 140 MG/DL (ref 0–150)
VLDLC SERPL CALC-MCNC: 28 MG/DL
WBC # BLD: 7.4 K/UL (ref 4–11)

## 2020-02-03 PROCEDURE — 36415 COLL VENOUS BLD VENIPUNCTURE: CPT

## 2020-02-03 PROCEDURE — 6370000000 HC RX 637 (ALT 250 FOR IP): Performed by: PSYCHIATRY & NEUROLOGY

## 2020-02-03 PROCEDURE — 85025 COMPLETE CBC W/AUTO DIFF WBC: CPT

## 2020-02-03 PROCEDURE — 97535 SELF CARE MNGMENT TRAINING: CPT

## 2020-02-03 PROCEDURE — 1240000000 HC EMOTIONAL WELLNESS R&B

## 2020-02-03 PROCEDURE — 83036 HEMOGLOBIN GLYCOSYLATED A1C: CPT

## 2020-02-03 PROCEDURE — 97165 OT EVAL LOW COMPLEX 30 MIN: CPT

## 2020-02-03 PROCEDURE — 99233 SBSQ HOSP IP/OBS HIGH 50: CPT | Performed by: PSYCHIATRY & NEUROLOGY

## 2020-02-03 PROCEDURE — 80061 LIPID PANEL: CPT

## 2020-02-03 RX ORDER — OLANZAPINE 5 MG/1
5 TABLET ORAL DAILY
Status: DISCONTINUED | OUTPATIENT
Start: 2020-02-03 | End: 2020-02-05

## 2020-02-03 RX ADMIN — IBUPROFEN 400 MG: 400 TABLET, FILM COATED ORAL at 13:21

## 2020-02-03 RX ADMIN — OLANZAPINE 15 MG: 10 TABLET, FILM COATED ORAL at 21:29

## 2020-02-03 RX ADMIN — OLANZAPINE 5 MG: 5 TABLET, FILM COATED ORAL at 13:20

## 2020-02-03 RX ADMIN — ESCITALOPRAM OXALATE 10 MG: 10 TABLET ORAL at 09:20

## 2020-02-03 ASSESSMENT — PAIN SCALES - GENERAL: PAINLEVEL_OUTOF10: 8

## 2020-02-03 NOTE — PLAN OF CARE
Pt has been isolative to his room most of the day stating he was in pain. He received ibuprofen with effective results. He denies SI/HI . He is not attending groups or providing adl's just lays in bed except to get mels. He is irritable at times. Will continue to monitor.   Jong HOGAN, RN-BC

## 2020-02-03 NOTE — PLAN OF CARE
Problem: Depressive Behavior With or Without Suicide Precautions:  Goal: Able to verbalize acceptance of life and situations over which he or she has no control  Description  Able to verbalize acceptance of life and situations over which he or she has no control  2/2/2020 2045 by Jonatan Iniguez RN  Outcome: Ongoing  Goal: Absence of self-harm  Description  Absence of self-harm  2/2/2020 2045 by Jonatan Iniguez RN  Outcome: Ongoing  Patient states that he remains depressed and no change in his mood this evening. He said that he is safe in the hospital.    Problem: Altered Mood, Manic Behavior:  Goal: Able to sleep  Description  Able to sleep  2/2/2020 2045 by Jonatan Iniguez RN  Outcome: Ongoing  Goal: Able to verbalize decrease in frequency and intensity of racing thoughts  Description  Able to verbalize decrease in frequency and intensity of racing thoughts  Outcome: Ongoing   Patient is hopeful to sleep this evening. He is medicated with trazodone 50 mg po for sleep, will continue to monitor.   Problem: Altered Mood, Psychotic Behavior:  Goal: Able to demonstrate trust by eating, participating in treatment and following staff's direction  Description  Able to demonstrate trust by eating, participating in treatment and following staff's direction  2/2/2020 2045 by Jonatan Iniguez RN  Outcome: Ongoing  2/2/2020 1446 by Gissell Alberto RN  Outcome: Ongoing  Goal: Able to verbalize decrease in frequency and intensity of hallucinations  Description  Able to verbalize decrease in frequency and intensity of hallucinations  Outcome: Ongoing  Goal: Able to verbalize reality based thinking  Description  Able to verbalize reality based thinking  2/2/2020 2045 by Jonatan Iniguez RN  Outcome: Ongoing  2/2/2020 1446 by Gissell Alberto RN  Outcome: Ongoing  Goal: Ability to achieve adequate nutritional intake will improve  Description  Ability to achieve adequate nutritional intake will improve  Outcome: Ongoing  Goal: Ability to interact with others will improve  Description  Ability to interact with others will improve  Outcome: Ongoing   Patient is eating and drinking without encouragement. Will continue to monitor patient appetite as indicated.

## 2020-02-03 NOTE — PROGRESS NOTES
Inpatient Occupational Therapy  Evaluation and Treatment    Unit:  [facility]  Date:  2/3/2020  Patient Name:    Mela Bailey  Admitting diagnosis: Anxiety [F41.9]  Admit Date:  2020  Precautions/Restrictions/WB Status/ Lines/ Wounds/ Oxygen:  Up as tolerated  Treatment Time:  11:39-12:00  Treatment Number:  1    Patient Goals for Therapy:  \" Get my anxiety and my pain level controlled. \"      Discharge Recommendations:  Continue to assess. DME needs for discharge:   NT     AM-PAC Score:  24     Home Health S4 Level:  NA       ACLS:  Pt.  [address] [facility] [address] ACLS for today. Preadmission Environment:    Pt. Lives with sister. Home environment:   two story house  Steps to enter first floor:    2  Steps to enter    Steps to second floor  Full Flight of 13  Bathroom:  [address] [facility]    Equipment owned: NA    Preadmission Status / PLOF:  History of falls   No  Pt. Able to drive   No; \"I just get rides from friends. \"  Pt Fully independent for ADLs/IADLs. Yes    Pt. Fully independent for transfers and gait and walked with: No Device   Sleep Hygiene:  2-3 hours sleep avg.; \"Some times I sleep for a couple of day. \"  Income:  Unemployed; pt was a  2 yrs ago. Pt. Reports difficulty holding down a job secondary to anxiety. Financial Management:  self  Leisure Interests:  Pt. States that he use to enjoy the outdoor however its difficulty to get outside of the house. Pt. Stated this has been going on for years. Pt. Reports that he enjoys listening to music, movies. Medication Management:  Self;  Educated pt on memory strategies to take medications. Health Management:  Pt. Reports that he dose not have a PCP, Psychiatrist or therapist.  Social Network:  sister  Stressors:  1. Being around a bunch of people. 2. Being yelled out. 3. Anxiety. Coping Skills:  Isolate, smoke weed    Pain  Yes    Ratin:10  Location:  Burning pain going down both legs. Pain Medicine Status:  Notified nsg.     Cognition A&Ox4  Patient presents as very anxious. Follows multiple step commands. Upper Extremity ROM:    WFL, pt able to perform all bed mobility, transfers, and gait without ROM limitation. Upper Extremity Strength:    WFL, pt able to perform all bed mobility, transfers, and gait without strength limitation. Upper Extremity Sensation:    Numbness and tingling in B hands. Upper Extremity Proprioception:    No apparent deficits. Skin Integrity:  WFL     Coordination and Tone:  WFL    Balance  Static Sitting:   Good   Dynamic Sitting:   Good   Static Standing:  Good   Dynamic Standing:  Good     Bed mobility:  Independent  Supine to sit:  Sit to supine:  Scooting to head of bed:  Scooting in sitting:  Rolling:  Bridging:    Transfers:  Independent  Sit to stand:  Stand to sit:  Bed/Chair to/from toilet:    Self Care: Toileting:  Grooming:  Dressing:    Exercise / Activities Initiated:   Pt. Educated on role of OT. Pt. Participated in:  Eval and medication management. Assessment of Deficits:   Pt demonstrated decreased activity tolerance, pain, decreased cognition and decreased ADL/IADL status. Pt. Limited during evaluation by decreased participation, decreased cognition, and anxiety. At end of evaluation, pt. In room. Goal(s) : To be met in 3 Visits:  1). Pt. To complete ADM. 2). Pt. To verbalize understanding of sleep hygiene education. To be met in 5 Visits:  1). Pt. To complete 1 SMART long term goal and 2 SMART short term goals with mod assist.  2). Pt. To verbalize 3 new coping skills. 3). Pt. To complete interest check list.    4). Pt. To verbalize understanding of 3 communication styles. 5). Pt. To complete wellness plan. 6). Pt. To complete a daily schedule of healthy activities/routines with mod assist.   7). Pt. To identify 2 memory strategies to take medications as prescribed. Rehabilitation Potential:  Good for goals listed above.     Strengths for achieving goals include:  PLOF  Barriers to achieving goals include:  Decreased cognition     Plan: To be seen 2-5x/week while in acute care setting for therapeutic exercises/act, ADL retraining, NMR and patient/caregiver ed/instruction.      Timed Code Treatment Minutes:    11 minutes    Total Treatment Time:    21  minutes    Signature and License Number    Delmy Huston OTR/L  #[id number]        If patient discharges from this facility prior to next visit, this note will serve as the Discharge Summary

## 2020-02-03 NOTE — PROGRESS NOTES
1:1 Assessment 10 minutes. Patient is alert and oriented x 4. Uses direct eye contact and is dressed appropriately in hospital garb. Patient denied SI/HI,A/V hallucinations. The patient stated his mood was:\"Not changed from admission\". Patient didn't attend groups this shift. Patient is hoping to be discharged from the hospital when feeling better. Patient is medication compliant. Judgement,insight and impulse control are limited. Vital signs are noted. Safety checks continue Q 15 minutes throughout the shift. PRNS this shift? Vistaril 50 mg, trazodone 50 mg po = effective  Patient obtained 7 hours of sleep this shift.

## 2020-02-04 PROBLEM — F33.2 SEVERE EPISODE OF RECURRENT MAJOR DEPRESSIVE DISORDER, WITHOUT PSYCHOTIC FEATURES (HCC): Status: ACTIVE | Noted: 2020-02-04

## 2020-02-04 PROBLEM — F41.1 ANXIETY STATE: Status: ACTIVE | Noted: 2020-02-02

## 2020-02-04 LAB
ESTIMATED AVERAGE GLUCOSE: 91.1 MG/DL
HBA1C MFR BLD: 4.8 %

## 2020-02-04 PROCEDURE — 1240000000 HC EMOTIONAL WELLNESS R&B

## 2020-02-04 PROCEDURE — 99233 SBSQ HOSP IP/OBS HIGH 50: CPT | Performed by: PSYCHIATRY & NEUROLOGY

## 2020-02-04 PROCEDURE — 6370000000 HC RX 637 (ALT 250 FOR IP): Performed by: PSYCHIATRY & NEUROLOGY

## 2020-02-04 RX ADMIN — OLANZAPINE 15 MG: 10 TABLET, FILM COATED ORAL at 20:39

## 2020-02-04 RX ADMIN — ESCITALOPRAM OXALATE 10 MG: 10 TABLET ORAL at 08:44

## 2020-02-04 RX ADMIN — IBUPROFEN 400 MG: 400 TABLET, FILM COATED ORAL at 23:10

## 2020-02-04 RX ADMIN — TRAZODONE HYDROCHLORIDE 50 MG: 50 TABLET ORAL at 23:10

## 2020-02-04 RX ADMIN — HYDROXYZINE PAMOATE 50 MG: 50 CAPSULE ORAL at 15:36

## 2020-02-04 RX ADMIN — OLANZAPINE 5 MG: 5 TABLET, FILM COATED ORAL at 08:44

## 2020-02-04 ASSESSMENT — PAIN SCALES - GENERAL: PAINLEVEL_OUTOF10: 9
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
## 2020-02-04 NOTE — PLAN OF CARE
Problem: Altered Mood, Psychotic Behavior:  Goal: Ability to interact with others will improve  Description  Ability to interact with others will improve  2/4/2020 0417 by Joselin Gatica RN  Outcome: Corey Calderon was visible in the milieu during the early part of the evening. He attended the evening groups but had little interaction with peers. Problem: Altered Mood, Psychotic Behavior:  Goal: Able to demonstrate trust by eating, participating in treatment and following staff's direction  Description  Able to demonstrate trust by eating, participating in treatment and following staff's direction  2/4/2020 0417 by Joselin Gatica RN  Outcome: Corey Calderon was compliant with his hs medications. No paranoia regarding medications noted. Initial dose of 15 mg Zyprexa administered. Problem: Altered Mood, Psychotic Behavior:  Goal: Able to verbalize decrease in frequency and intensity of hallucinations  Description  Able to verbalize decrease in frequency and intensity of hallucinations  2/4/2020 0417 by Joselin Gatica RN  Outcome: Corey Calderon reports that he is still hearing voices but he is unable to make out what they are saying. He appears distracted and behavior patterns indicate that he is responding to internal stimuli.

## 2020-02-04 NOTE — PROGRESS NOTES
- 8.2 g/dL Final    Alb 02/01/2020 4.5  3.4 - 5.0 g/dL Final    Albumin/Globulin Ratio 02/01/2020 2.0  1.1 - 2.2 Final    Total Bilirubin 02/01/2020 <0.2  0.0 - 1.0 mg/dL Final    Alkaline Phosphatase 02/01/2020 95  40 - 129 U/L Final    ALT 02/01/2020 27  10 - 40 U/L Final    AST 02/01/2020 22  15 - 37 U/L Final    Globulin 02/01/2020 2.3  g/dL Final    Amphetamine Screen, Urine 02/01/2020 Neg  Negative <1000ng/mL Final    Barbiturate Screen, Ur 02/01/2020 Neg  Negative <200 ng/mL Final    Benzodiazepine Screen, Urine 02/01/2020 Neg  Negative <200 ng/mL Final    Cannabinoid Scrn, Ur 02/01/2020 POSITIVE* Negative <50 ng/mL Final    Cocaine Metabolite Screen, Urine 02/01/2020 Neg  Negative <300 ng/mL Final    Opiate Scrn, Ur 02/01/2020 Neg  Negative <300 ng/mL Final    Comment: \"Therapeutic levels of pain medication, especially oxycontin and synthetic  opioids, may not be detected by this Methodology. Pain management screen  panel  Drug panel-PM-Hi Res Ur, Interp (PAIN) should be considered for drug  monitoring \".  PCP Screen, Urine 02/01/2020 Neg  Negative <25 ng/mL Final    Methadone Screen, Urine 02/01/2020 Neg  Negative <300 ng/mL Final    Propoxyphene Scrn, Ur 02/01/2020 Neg  Negative <300 ng/mL Final    Oxycodone Urine 02/01/2020 POSITIVE* Negative <100 ng/ml Final    pH, UA 02/01/2020 5.0   Final    Comment: Urine pH less than 5.0 or greater than 8.0 may indicate sample adulteration. Another sample should be collected if clinically  indicated.  Drug Screen Comment: 02/01/2020 see below   Final    Comment: This method is a screening test to detect only these drug  classes as part of a medical workup. Confirmatory testing  by another method should be ordered if clinically indicated.       Color, UA 02/01/2020 Yellow  Straw/Yellow Final    Clarity, UA 02/01/2020 Clear  Clear Final    Glucose, Ur 02/01/2020 Negative  Negative mg/dL Final    Bilirubin Urine 02/01/2020 Negative Negative Final    Ketones, Urine 02/01/2020 Negative  Negative mg/dL Final    Specific Gravity, UA 02/01/2020 1.025  1.005 - 1.030 Final    Blood, Urine 02/01/2020 Negative  Negative Final    pH, UA 02/01/2020 6.0  5.0 - 8.0 Final    Protein, UA 02/01/2020 Negative  Negative mg/dL Final    Urobilinogen, Urine 02/01/2020 0.2  <2.0 E.U./dL Final    Nitrite, Urine 02/01/2020 Negative  Negative Final    Leukocyte Esterase, Urine 02/01/2020 Negative  Negative Final    Microscopic Examination 02/01/2020 Not Indicated   Final    Urine Type 02/01/2020 NotGiven   Final    Urine received in a container without preservatives.     Urine Reflex to Culture 02/01/2020 Not Indicated   Final    TSH 02/02/2020 1.11  0.27 - 4.20 uIU/mL Final    Cholesterol, Total 02/03/2020 183  0 - 199 mg/dL Final    Triglycerides 02/03/2020 140  0 - 150 mg/dL Final    HDL 02/03/2020 27* 40 - 60 mg/dL Final    LDL Calculated 02/03/2020 128* <100 mg/dL Final    VLDL Cholesterol Calculated 02/03/2020 28  Not Established mg/dL Final    WBC 02/03/2020 7.4  4.0 - 11.0 K/uL Final    RBC 02/03/2020 5.19  4.20 - 5.90 M/uL Final    Hemoglobin 02/03/2020 16.6  13.5 - 17.5 g/dL Final    Hematocrit 02/03/2020 49.5  40.5 - 52.5 % Final    MCV 02/03/2020 95.3  80.0 - 100.0 fL Final    MCH 02/03/2020 31.9  26.0 - 34.0 pg Final    MCHC 02/03/2020 33.5  31.0 - 36.0 g/dL Final    RDW 02/03/2020 13.6  12.4 - 15.4 % Final    Platelets 64/15/2437 207  135 - 450 K/uL Final    MPV 02/03/2020 9.5  5.0 - 10.5 fL Final    Neutrophils % 02/03/2020 60.5  % Final    Lymphocytes % 02/03/2020 31.1  % Final    Monocytes % 02/03/2020 5.5  % Final    Eosinophils % 02/03/2020 2.2  % Final    Basophils % 02/03/2020 0.7  % Final    Neutrophils Absolute 02/03/2020 4.5  1.7 - 7.7 K/uL Final    Lymphocytes Absolute 02/03/2020 2.3  1.0 - 5.1 K/uL Final    Monocytes Absolute 02/03/2020 0.4  0.0 - 1.3 K/uL Final    Eosinophils Absolute 02/03/2020 0.2  0.0 - 0.6 K/uL Final    Basophils Absolute 02/03/2020 0.1  0.0 - 0.2 K/uL Final            Medications  Current Facility-Administered Medications: OLANZapine (ZYPREXA) tablet 15 mg, 15 mg, Oral, Nightly  OLANZapine (ZYPREXA) tablet 5 mg, 5 mg, Oral, Daily  ibuprofen (ADVIL;MOTRIN) tablet 400 mg, 400 mg, Oral, Q6H PRN  magnesium hydroxide (MILK OF MAGNESIA) 400 MG/5ML suspension 30 mL, 30 mL, Oral, Daily PRN  traZODone (DESYREL) tablet 50 mg, 50 mg, Oral, Nightly PRN  hydrOXYzine (VISTARIL) capsule 50 mg, 50 mg, Oral, TID PRN  escitalopram (LEXAPRO) tablet 10 mg, 10 mg, Oral, Daily    ASSESSMENT AND PLAN    Active Problems:    Anxiety    Migraine without aura and without status migrainosus, not intractable    Leukocytosis    Cannabis abuse    Tobacco abuse  Resolved Problems:    * No resolved hospital problems. *       1. Patient s symptoms   show no change  2. Probable discharge is 2-3 days  3. Discharge planning is incomplete  4 Suicidal ideation is unchanged  5 I spent 35 minutes face to face and more than 50 % was spent coordinating care

## 2020-02-04 NOTE — PLAN OF CARE
5 [facility]  Day 3 Interdisciplinary Treatment Plan NOTE    Review Date & Time: 2/4/20 0939    Patient was not in treatment team    Admission Type:   Admission Type: Voluntary    Reason for admission:     Estimated Length of Stay Update:  3 days  Estimated Discharge Date Update: 2/7/20    PATIENT STRENGTHS:  Patient Strengths Strengths: Positive Support  Patient Strengths and Limitations:Limitations: Tendency to isolate self, Lacks leisure interests  Addictive Behavior:Addictive Behavior  In the past 3 months, have you felt or has someone told you that you have a problem with:  : None  Do you have a history of Chemical Use?: Yes  Do you have a history of Alcohol Use?: No  Do you have a history of Street Drug Abuse?: Yes  Histroy of Prescripton Drug Abuse?: No  Medical Problems:  Past Medical History:   Diagnosis Date    Chronic back pain     Hypertension     Pneumonia     Tendonitis     left wrist       Risk:  Fall RiskTotal: 65  Wallace Scale Wallace Scale Score: 22  BVC Total: 0  Change in scores none. Changes to plan of Care  none    Status EXAM:   Status and Exam  Normal: No  Facial Expression: Flat, Sad, Expressionless  Affect: Blunt  Level of Consciousness: Alert  Mood:Normal: No  Mood: Depressed  Motor Activity:Normal: No  Motor Activity: Decreased  Interview Behavior: Cooperative  Preception: [place] to Person, [place] to Time  Attention:Normal: No  Attention: Distractible  Thought Processes: Blocking  Thought Content:Normal: No  Thought Content: Paranoia  Hallucinations:  Auditory (Comment)  Delusions: No  Memory:Normal: No  Memory: Poor Recent, Poor Remote  Insight and Judgment: No  Insight and Judgment: Poor Judgment, Poor Insight  Present Suicidal Ideation: No  Present Homicidal Ideation: No    Daily Assessment Last Entry:   Daily Sleep (WDL): Within Defined Limits         Patient Currently in Pain: Yes  Daily Nutrition (WDL): Within Defined Limits    Patient Monitoring:  Frequency of Checks: 4 times per hour, close    Psychiatric Symptoms:   Depression Symptoms  Depression Symptoms: Isolative  Anxiety Symptoms  Anxiety Symptoms: No problems reported or observed. Siri Symptoms  Siri Symptoms: No problems reported or observed. Psychosis Symptoms  Delusion Type: No problems reported or observed. (denies)    Suicide Risk CSSR-S:  1) Within the past month, have you wished you were dead or wished you could go to sleep and not wake up? : No(contracts for safety)  2) Have you actually had any thoughts of killing yourself? : No  6) Have you ever done anything, started to do anything, or prepared to do anything to end your life?: No  Change in Result none Change in Plan of care none      EDUCATION:   Learner Progress Toward Treatment Goals: Reviewed goals and plan of care    Method: Individual    Outcome: Verbalized understanding    PATIENT GOALS: none    PLAN/TREATMENT RECOMMENDATIONS UPDATE:continue treatment, encourage groups    GOALS UPDATE:   Time frame for Short-Term Goals: 2 days      Renny Rebolledo RN

## 2020-02-04 NOTE — PROGRESS NOTES
Department of Psychiatry  Attending Progress Note  Chief Complaint: depression  Erik Jean stated that he is still depressed. He feels that the Zyprexa and Lexapro have been well tolerated thus far. He is worried frequently and has difficulty being around others. Gets agitated . Lives with sister in [place], and plans to stay with her at [place]. Occasional groups and denied SI    Patient's chart was reviewed and collaborated with  about the treatment plan. SUBJECTIVE:    Patient is feeling better. Suicidal ideation:  denies suicidal ideation. Patient does not have medication side effects. ROS: Patient has new complaints: no  Sleeping adequately:  Yes   Appetite adequate: Yes  Attending groups: Yes  Visitors:No    OBJECTIVE    Physical  VITALS:  BP (!) 143/85   Pulse 62   Temp 98.5 °F (36.9 °C) (Oral)   Resp 16   Ht 5' 10\" (1.778 m)   Wt 200 lb (90.7 kg)   SpO2 95%   BMI 28.70 kg/m²     Mental Status Examination:  Patients appearance was lying in bed. Thoughts are Goal directed. Homicidal ideations none. No abnormal movements, tics or mannerisms. Memory intact Aims 0. Concentration Fair. Alert and oriented X 4. Insight and Judgement impaired insight. Patient was cooperative.  Patient gait normal. Mood depressed, constricted, decreased range and depressed, affect depressed affect Hallucinations Absent, suicidal ideations no specific plan to harm self Speech normal volume  Data  Labs:   Admission on 02/01/2020   Component Date Value Ref Range Status    Ethanol Lvl 02/01/2020 None Detected  mg/dL Final    Comment:    None Detected  Conversion factor:  100 mg/dl = .100 g/dl  For Medical Purposes Only      Salicylate, Serum 11/34/0091 <0.3* 15.0 - 30.0 mg/dL Final    Comment: Therapeutic Range: 15.0-30.0 mg/dL  Toxic: >30.0 mg/dL      Acetaminophen Level 02/01/2020 <5* 10 - 30 ug/mL Final    Comment: Therapeutic Range: 10.0-30.0 ug/mL  Toxic: >=150 ug/mL      WBC 02/01/2020 16.5* 4.0 - 11.0 K/uL Ratio 02/01/2020 2.0  1.1 - 2.2 Final    Total Bilirubin 02/01/2020 <0.2  0.0 - 1.0 mg/dL Final    Alkaline Phosphatase 02/01/2020 95  40 - 129 U/L Final    ALT 02/01/2020 27  10 - 40 U/L Final    AST 02/01/2020 22  15 - 37 U/L Final    Globulin 02/01/2020 2.3  g/dL Final    Amphetamine Screen, Urine 02/01/2020 Neg  Negative <1000ng/mL Final    Barbiturate Screen, Ur 02/01/2020 Neg  Negative <200 ng/mL Final    Benzodiazepine Screen, Urine 02/01/2020 Neg  Negative <200 ng/mL Final    Cannabinoid Scrn, Ur 02/01/2020 POSITIVE* Negative <50 ng/mL Final    Cocaine Metabolite Screen, Urine 02/01/2020 Neg  Negative <300 ng/mL Final    Opiate Scrn, Ur 02/01/2020 Neg  Negative <300 ng/mL Final    Comment: \"Therapeutic levels of pain medication, especially oxycontin and synthetic  opioids, may not be detected by this Methodology. Pain management screen  panel  Drug panel-PM-Hi Res Ur, Interp (PAIN) should be considered for drug  monitoring \".  PCP Screen, Urine 02/01/2020 Neg  Negative <25 ng/mL Final    Methadone Screen, Urine 02/01/2020 Neg  Negative <300 ng/mL Final    Propoxyphene Scrn, Ur 02/01/2020 Neg  Negative <300 ng/mL Final    Oxycodone Urine 02/01/2020 POSITIVE* Negative <100 ng/ml Final    pH, UA 02/01/2020 5.0   Final    Comment: Urine pH less than 5.0 or greater than 8.0 may indicate sample adulteration. Another sample should be collected if clinically  indicated.  Drug Screen Comment: 02/01/2020 see below   Final    Comment: This method is a screening test to detect only these drug  classes as part of a medical workup. Confirmatory testing  by another method should be ordered if clinically indicated.       Color, UA 02/01/2020 Yellow  Straw/Yellow Final    Clarity, UA 02/01/2020 Clear  Clear Final    Glucose, Ur 02/01/2020 Negative  Negative mg/dL Final    Bilirubin Urine 02/01/2020 Negative  Negative Final    Ketones, Urine 02/01/2020 Negative  Negative mg/dL Final    Specific Gravity, UA 02/01/2020 1.025  1.005 - 1.030 Final    Blood, Urine 02/01/2020 Negative  Negative Final    pH, UA 02/01/2020 6.0  5.0 - 8.0 Final    Protein, UA 02/01/2020 Negative  Negative mg/dL Final    Urobilinogen, Urine 02/01/2020 0.2  <2.0 E.U./dL Final    Nitrite, Urine 02/01/2020 Negative  Negative Final    Leukocyte Esterase, Urine 02/01/2020 Negative  Negative Final    Microscopic Examination 02/01/2020 Not Indicated   Final    Urine Type 02/01/2020 NotGiven   Final    Urine received in a container without preservatives.     Urine Reflex to Culture 02/01/2020 Not Indicated   Final    TSH 02/02/2020 1.11  0.27 - 4.20 uIU/mL Final    Cholesterol, Total 02/03/2020 183  0 - 199 mg/dL Final    Triglycerides 02/03/2020 140  0 - 150 mg/dL Final    HDL 02/03/2020 27* 40 - 60 mg/dL Final    LDL Calculated 02/03/2020 128* <100 mg/dL Final    VLDL Cholesterol Calculated 02/03/2020 28  Not Established mg/dL Final    WBC 02/03/2020 7.4  4.0 - 11.0 K/uL Final    RBC 02/03/2020 5.19  4.20 - 5.90 M/uL Final    Hemoglobin 02/03/2020 16.6  13.5 - 17.5 g/dL Final    Hematocrit 02/03/2020 49.5  40.5 - 52.5 % Final    MCV 02/03/2020 95.3  80.0 - 100.0 fL Final    MCH 02/03/2020 31.9  26.0 - 34.0 pg Final    MCHC 02/03/2020 33.5  31.0 - 36.0 g/dL Final    RDW 02/03/2020 13.6  12.4 - 15.4 % Final    Platelets 61/55/6536 207  135 - 450 K/uL Final    MPV 02/03/2020 9.5  5.0 - 10.5 fL Final    Neutrophils % 02/03/2020 60.5  % Final    Lymphocytes % 02/03/2020 31.1  % Final    Monocytes % 02/03/2020 5.5  % Final    Eosinophils % 02/03/2020 2.2  % Final    Basophils % 02/03/2020 0.7  % Final    Neutrophils Absolute 02/03/2020 4.5  1.7 - 7.7 K/uL Final    Lymphocytes Absolute 02/03/2020 2.3  1.0 - 5.1 K/uL Final    Monocytes Absolute 02/03/2020 0.4  0.0 - 1.3 K/uL Final    Eosinophils Absolute 02/03/2020 0.2  0.0 - 0.6 K/uL Final    Basophils Absolute 02/03/2020 0.1  0.0 - 0.2 K/uL Final    Hemoglobin A1C 02/03/2020 4.8  See comment % Final    Comment: Comment:  Diagnosis of Diabetes: > or = 6.5%  Increased risk of diabetes (Prediabetes): 5.7-6.4%  Glycemic Control: Nonpregnant Adults: <7.0%                    Pregnant: <6.0%        eAG 02/03/2020 91.1  mg/dL Final            Medications  Current Facility-Administered Medications: OLANZapine (ZYPREXA) tablet 15 mg, 15 mg, Oral, Nightly  OLANZapine (ZYPREXA) tablet 5 mg, 5 mg, Oral, Daily  ibuprofen (ADVIL;MOTRIN) tablet 400 mg, 400 mg, Oral, Q6H PRN  magnesium hydroxide (MILK OF MAGNESIA) 400 MG/5ML suspension 30 mL, 30 mL, Oral, Daily PRN  traZODone (DESYREL) tablet 50 mg, 50 mg, Oral, Nightly PRN  hydrOXYzine (VISTARIL) capsule 50 mg, 50 mg, Oral, TID PRN  escitalopram (LEXAPRO) tablet 10 mg, 10 mg, Oral, Daily    ASSESSMENT AND PLAN    Active Problems:    Anxiety    Migraine without aura and without status migrainosus, not intractable    Leukocytosis    Cannabis abuse    Tobacco abuse  Resolved Problems:    * No resolved hospital problems. *       1. Patient s symptoms   are improving  2. Probable discharge is 1-2 days  3. Discharge planning is incomplete  4. Suicidal ideation is better  5. Total time with patient was 40 minutes and more than 50 % of that time was spent counseling the patient on their symptoms, treatment and expected goals.

## 2020-02-05 PROCEDURE — 99233 SBSQ HOSP IP/OBS HIGH 50: CPT | Performed by: PSYCHIATRY & NEUROLOGY

## 2020-02-05 PROCEDURE — 1240000000 HC EMOTIONAL WELLNESS R&B

## 2020-02-05 PROCEDURE — 6370000000 HC RX 637 (ALT 250 FOR IP): Performed by: PSYCHIATRY & NEUROLOGY

## 2020-02-05 RX ORDER — OLANZAPINE 5 MG/1
20 TABLET ORAL NIGHTLY
Status: DISCONTINUED | OUTPATIENT
Start: 2020-02-05 | End: 2020-02-10 | Stop reason: HOSPADM

## 2020-02-05 RX ORDER — ESCITALOPRAM OXALATE 10 MG/1
20 TABLET ORAL DAILY
Status: DISCONTINUED | OUTPATIENT
Start: 2020-02-06 | End: 2020-02-07

## 2020-02-05 RX ADMIN — TRAZODONE HYDROCHLORIDE 50 MG: 50 TABLET ORAL at 21:18

## 2020-02-05 RX ADMIN — IBUPROFEN 400 MG: 400 TABLET, FILM COATED ORAL at 17:44

## 2020-02-05 RX ADMIN — ESCITALOPRAM OXALATE 10 MG: 10 TABLET ORAL at 08:46

## 2020-02-05 RX ADMIN — OLANZAPINE 20 MG: 10 TABLET, FILM COATED ORAL at 21:18

## 2020-02-05 RX ADMIN — HYDROXYZINE PAMOATE 50 MG: 50 CAPSULE ORAL at 12:40

## 2020-02-05 RX ADMIN — HYDROXYZINE PAMOATE 50 MG: 50 CAPSULE ORAL at 23:31

## 2020-02-05 RX ADMIN — HYDROXYZINE PAMOATE 50 MG: 50 CAPSULE ORAL at 17:44

## 2020-02-05 RX ADMIN — OLANZAPINE 5 MG: 5 TABLET, FILM COATED ORAL at 08:46

## 2020-02-05 ASSESSMENT — PAIN DESCRIPTION - PAIN TYPE: TYPE: ACUTE PAIN

## 2020-02-05 ASSESSMENT — PAIN DESCRIPTION - LOCATION: LOCATION: HEAD

## 2020-02-05 ASSESSMENT — PAIN DESCRIPTION - DESCRIPTORS: DESCRIPTORS: HEADACHE

## 2020-02-05 ASSESSMENT — PAIN SCALES - GENERAL: PAINLEVEL_OUTOF10: 9

## 2020-02-05 NOTE — PROGRESS NOTES
Department of Psychiatry  Attending Progress Note  Chief Complaint: follow-up Pt stated that he has not experience any changes on his depression and he cont to hear voices. Pt is difficult to enage. I encourage pt to attend grps and will d/c zyprexa during the day and inc lexapro. He apperas to be laying in bed comfortable, and does not appear to have any interest on getting out of bed and it seems sx's more consistent with a characterological structure. Patient's chart was reviewed and collaborated with  about the treatment plan. SUBJECTIVE:    Patient is feeling unchanged. Suicidal ideation:  passive. Patient does not have medication side effects. ROS: Patient has new complaints: no  Sleeping adequately:  Yes   Appetite adequate: not normal decrease  Attending groups: No: isolative  Visitors:No    OBJECTIVE    Physical  VITALS:  BP (!) 141/85   Pulse 69   Temp 97.5 °F (36.4 °C) (Oral)   Resp 16   Ht 5' 10\" (1.778 m)   Wt 200 lb (90.7 kg)   SpO2 95%   BMI 28.70 kg/m²     Mental Status Examination:  Patients appearance was well-appearing, hospital attire, lying in bed, fair grooming and fair hygiene. Thoughts are [place]. Homicidal ideations none. No abnormal movements, tics or mannerisms. Memory massiel Aims 0. Concentration Poor. Alert and oriented X 4. Insight and Judgement poor. Patient was uncooperative.  Patient gait normal. Mood anxious and depressed, affect flat affect Hallucinations Absent, suicidal ideations denies  Speech low production;one word answers  Data  Labs:   Admission on 02/01/2020   Component Date Value Ref Range Status    Ethanol Lvl 02/01/2020 None Detected  mg/dL Final    Comment:    None Detected  Conversion factor:  100 mg/dl = .100 g/dl  For Medical Purposes Only      Salicylate, Serum 85/92/0423 <0.3* 15.0 - 30.0 mg/dL Final    Comment: Therapeutic Range: 15.0-30.0 mg/dL  Toxic: >30.0 mg/dL      Acetaminophen Level 02/01/2020 <5* 10 - 30 ug/mL Final Comment: Therapeutic Range: 10.0-30.0 ug/mL  Toxic: >=150 ug/mL      WBC 02/01/2020 16.5* 4.0 - 11.0 K/uL Final    RBC 02/01/2020 5.16  4.20 - 5.90 M/uL Final    Hemoglobin 02/01/2020 16.7  13.5 - 17.5 g/dL Final    Hematocrit 02/01/2020 49.4  40.5 - 52.5 % Final    MCV 02/01/2020 95.7  80.0 - 100.0 fL Final    MCH 02/01/2020 32.3  26.0 - 34.0 pg Final    MCHC 02/01/2020 33.7  31.0 - 36.0 g/dL Final    RDW 02/01/2020 13.6  12.4 - 15.4 % Final    Platelets 89/07/9014 239  135 - 450 K/uL Final    MPV 02/01/2020 9.1  5.0 - 10.5 fL Final    Neutrophils % 02/01/2020 73.5  % Final    Lymphocytes % 02/01/2020 19.8  % Final    Monocytes % 02/01/2020 5.3  % Final    Eosinophils % 02/01/2020 0.9  % Final    Basophils % 02/01/2020 0.5  % Final    Neutrophils Absolute 02/01/2020 12.1* 1.7 - 7.7 K/uL Final    Lymphocytes Absolute 02/01/2020 3.3  1.0 - 5.1 K/uL Final    Monocytes Absolute 02/01/2020 0.9  0.0 - 1.3 K/uL Final    Eosinophils Absolute 02/01/2020 0.1  0.0 - 0.6 K/uL Final    Basophils Absolute 02/01/2020 0.1  0.0 - 0.2 K/uL Final    Sodium 02/01/2020 137  136 - 145 mmol/L Final    Potassium 02/01/2020 3.6  3.5 - 5.1 mmol/L Final    Chloride 02/01/2020 102  99 - 110 mmol/L Final    CO2 02/01/2020 22  21 - 32 mmol/L Final    Anion Gap 02/01/2020 13  3 - 16 Final    Glucose 02/01/2020 98  70 - 99 mg/dL Final    BUN 02/01/2020 13  7 - 20 mg/dL Final    CREATININE 02/01/2020 0.8* 0.9 - 1.3 mg/dL Final    GFR Non- 02/01/2020 >60  >60 Final    Comment: >60 mL/min/1.73m2 EGFR, calc. for ages 25 and older using the  MDRD formula (not corrected for weight), is valid for stable  renal function.  GFR  02/01/2020 >60  >60 Final    Comment: Chronic Kidney Disease: less than 60 ml/min/1.73 sq.m. Kidney Failure: less than 15 ml/min/1.73 sq.m. Results valid for patients 18 years and older.       Calcium 02/01/2020 8.9  8.3 - 10.6 mg/dL Final    Total Protein 02/03/2020 0.2  0.0 - 0.6 K/uL Final    Basophils Absolute 02/03/2020 0.1  0.0 - 0.2 K/uL Final    Hemoglobin A1C 02/03/2020 4.8  See comment % Final    Comment: Comment:  Diagnosis of Diabetes: > or = 6.5%  Increased risk of diabetes (Prediabetes): 5.7-6.4%  Glycemic Control: Nonpregnant Adults: <7.0%                    Pregnant: <6.0%        eAG 02/03/2020 91.1  mg/dL Final            Medications  Current Facility-Administered Medications: [START ON 2/6/2020] escitalopram (LEXAPRO) tablet 20 mg, 20 mg, Oral, Daily  OLANZapine (ZYPREXA) tablet 20 mg, 20 mg, Oral, Nightly  ibuprofen (ADVIL;MOTRIN) tablet 400 mg, 400 mg, Oral, Q6H PRN  magnesium hydroxide (MILK OF MAGNESIA) 400 MG/5ML suspension 30 mL, 30 mL, Oral, Daily PRN  traZODone (DESYREL) tablet 50 mg, 50 mg, Oral, Nightly PRN  hydrOXYzine (VISTARIL) capsule 50 mg, 50 mg, Oral, TID PRN    ASSESSMENT AND PLAN    Principal Problem:    Severe episode of recurrent major depressive disorder, without psychotic features ([address]  Active Problems:    Anxiety state    Migraine without aura and without status migrainosus, not intractable    Leukocytosis    Cannabis abuse    Tobacco abuse  Resolved Problems:    * No resolved hospital problems. *       1. Patient s symptoms   show no change  2. Probable discharge is 1-2 days  3. Discharge planning is incomplete  4 Suicidal ideation is unchanged  5 I spent 35 minutes face to face and more than 50 % was spent coordinating care

## 2020-02-05 NOTE — PLAN OF CARE
Pt was visible and attended group this evening. He has been more visible this evening. He is medication compliant. Will continue to monitor.   Mick BERGMANN, RN-BC

## 2020-02-05 NOTE — GROUP NOTE
Group Therapy Note    Date: 2/5/2020    Group Start Time: 1110  Group End Time: 3068  Group Topic: Psychotherapy    [facility]    Azalia Richard, [facility]        Group Therapy Note    Attendees: 14       Patient's Goal:  Pt will improve interpersonal interactions through group processing and agenda setting.      Notes:  Pt participated in group by listening.      Status After Intervention:  Unchanged    Participation Level: Minimal    Participation Quality: Appropriate and Resistant      Speech:  hesitant      Thought Process/Content: Perseverating      Affective Functioning: Congruent      Mood: anxious      Level of consciousness:  Inattentive      Response to Learning: Able to verbalize current knowledge/experience and Resistant      Endings: None Reported    Modes of Intervention: Education, Support, Socialization, Exploration, Clarifying, Problem-solving, Activity, Movement, Confrontation, Limit-setting and Reality-testing      Discipline Responsible: /Counselor      Signature:  Azalia Richard, [facility]

## 2020-02-05 NOTE — BH NOTE
Group Therapy Note    Date: 2/4/2020  Start Time: 20:00  End Time:  21:00  Number of Participants: 15    Type of Group: Recreational  Wrap up    Chaka Manriquez Information  Module Name:  /  Session Number:  /    Patient's Goal:  Go to group    Notes:  Continuing to work on goal    Status After Intervention:  Unchanged    Participation Level:  Active Listener and Minimal    Participation Quality: Appropriate and Attentive      Speech:  normal      Thought Process/Content: Logical  Perseverating      Affective Functioning: Flat      Mood: depressed      Level of consciousness:  Alert, Oriented x4 and Attentive      Response to Learning: Able to change behavior      Endings: None Reported    Modes of Intervention: Socialization and Problem-solving      Discipline Responsible: Behavorial Health Tech      Signature:  Darwin Clark

## 2020-02-06 PROCEDURE — 1240000000 HC EMOTIONAL WELLNESS R&B

## 2020-02-06 PROCEDURE — 6370000000 HC RX 637 (ALT 250 FOR IP): Performed by: PSYCHIATRY & NEUROLOGY

## 2020-02-06 PROCEDURE — 99233 SBSQ HOSP IP/OBS HIGH 50: CPT | Performed by: PSYCHIATRY & NEUROLOGY

## 2020-02-06 RX ORDER — BUSPIRONE HYDROCHLORIDE 10 MG/1
10 TABLET ORAL 3 TIMES DAILY
Status: DISCONTINUED | OUTPATIENT
Start: 2020-02-06 | End: 2020-02-07

## 2020-02-06 RX ADMIN — OLANZAPINE 20 MG: 10 TABLET, FILM COATED ORAL at 21:51

## 2020-02-06 RX ADMIN — HYDROXYZINE PAMOATE 50 MG: 50 CAPSULE ORAL at 16:57

## 2020-02-06 RX ADMIN — BUSPIRONE HYDROCHLORIDE 10 MG: 10 TABLET ORAL at 21:51

## 2020-02-06 RX ADMIN — TRAZODONE HYDROCHLORIDE 50 MG: 50 TABLET ORAL at 21:51

## 2020-02-06 RX ADMIN — IBUPROFEN 400 MG: 400 TABLET, FILM COATED ORAL at 11:07

## 2020-02-06 RX ADMIN — HYDROXYZINE PAMOATE 50 MG: 50 CAPSULE ORAL at 11:07

## 2020-02-06 RX ADMIN — ESCITALOPRAM OXALATE 20 MG: 10 TABLET ORAL at 08:52

## 2020-02-06 RX ADMIN — IBUPROFEN 400 MG: 400 TABLET, FILM COATED ORAL at 00:12

## 2020-02-06 RX ADMIN — BUSPIRONE HYDROCHLORIDE 10 MG: 10 TABLET ORAL at 11:08

## 2020-02-06 ASSESSMENT — PAIN SCALES - GENERAL
PAINLEVEL_OUTOF10: 9
PAINLEVEL_OUTOF10: 10

## 2020-02-06 NOTE — PROGRESS NOTES
Department of Psychiatry  Nurse Practitioner Progress Note      SUBJECTIVE:  \"I'm depressed. \"     OBJECTIVE  Pt reports his depression is worse today than yesterday and rates 9/10. Anxiety is worse today than yesterday. Pt endorses poor sleep last night and had 2-3 night time awakening s due to nightmares. Encouraged pt to continue to attend groups. Pt endorses depression at this time debilitating and not permitting pt to attend group or be around people or participate in other activities with milieu. Patient's chart was reviewed and collaborated with  about the treatment plan. SUBJECTIVE:    Patient is feeling unchanged. Suicidal ideation:  passive. Patient does not have medication side effects. ROS: Patient has new complaints: no  Sleeping adequately:  Yes   Appetite adequate: not normal decrease  Attending groups: No: isolative  Visitors:No    Mental Status Examination:  Level of consciousness:  arousable to speech3  Appearance:  ill-appearing, street clothes, lying in bed, poor grooming and poor hygiene  Behavior/Motor:  psychomotor retardation  Attitude toward examiner:  uncooperative, poor eye contact and withdrawn  Speech:  spontaneous, normal volume and slow, low production  Mood:  \"I'm depressed. \"  Affect:  mood congruent and flat  Thought processes:  linear  Thought content:  Homocidal ideation denies  Suicidal Ideation:  denies  Delusions:  no evidence of delusions  Cognition:  oriented to person, place, and time  Abstract thinking:  concrete  Insight:  poor  Judgment:  poor      Physical  VITALS:  BP (!) 156/97   Pulse 70   Temp 97.6 °F (36.4 °C) (Oral)   Resp 16   Ht 5' 10\" (1.778 m)   Wt 200 lb (90.7 kg)   SpO2 95%   BMI 28.70 kg/m²   TEMPERATURE:  Current - Temp: 97.6 °F (36.4 °C);  Max - Temp  Av.7 °F (36.5 °C)  Min: 97.6 °F (36.4 °C)  Max: 97.7 °F (36.5 °C)  RESPIRATIONS RANGE: Resp  Av  Min: 16  Max: 16  PULSE RANGE: Pulse  Av.7  Min: 70  Max: 80  BLOOD

## 2020-02-06 NOTE — GROUP NOTE
Group Therapy Note    Date: 2/5/2020    Group Start Time: 2000  Group End Time: 2045  Group Topic: Rogerio Jeffery, RN        Group Therapy Note    Attendees: 12         Patient's Goal:  None stated    Notes:  Talked mostly in side conversations, not directly related to group or content    Status After Intervention:  Unchanged    Participation Level: Minimal    Participation Quality: Resistant      Speech:  normal      Thought Process/Content: Logical  Linear      Affective Functioning: Congruent      Mood: euthymic      Level of consciousness:  Alert and Oriented x4      Response to Learning: Resistant      Endings: None Reported    Modes of Intervention: Support and Socialization      Discipline Responsible: Registered Nurse         Signature:  Nahomy Vázquez RN

## 2020-02-06 NOTE — PROGRESS NOTES
Patient requested and received motrin 400 mg po for complaint of bilateral behind the knee pain that he rates at a 10. Will monitor for effectiveness.

## 2020-02-06 NOTE — PROGRESS NOTES
1:1 Assessment 10 minutes. Patient is alert and oriented x 3, not fully aware of his situation. . Uses direct eye contact and is dressed appropriately in street clothing. Patient denied SI/HI,A/V hallucinations, he denied having any hallucinations to this staff person. The patient stated his mood was:\"Better than yesterday\". Patient did attend groups this shift and participated appropriately. The patient stated he was here due to thoughts of suicide. Patient is medication compliant. Judgement,insight and impulse control are limited. Patient denied any physical complaints this evening. Vital signs are noted. Safety checks continue Q 15 minutes throughout the shift. PRNS this shift? Trazodone 50 mg po for sleep, vistaril 50 mg po for itching, motrin 400 mg po for c/o left leg behind the knee pain. .  Patient obtained 4 hours of sleep this shift.

## 2020-02-06 NOTE — PLAN OF CARE
RN  Outcome: Ongoing  2/5/2020 1402 by Martha Yoder RN  Outcome: Ongoing  Goal: Ability to achieve adequate nutritional intake will improve  Description  Ability to achieve adequate nutritional intake will improve  2/6/2020 0002 by Ronny Nicole RN  Outcome: Ongoing  Goal: Ability to interact with others will improve  Description  Ability to interact with others will improve  2/6/2020 0002 by Ronny Nicole RN  Outcome: Ongoing  Patient is eating well and getting drinks from the dayroom refrigerator. He was noted to eat snacks this evening. He is noted to interact with others on limited occasions. He is noted to watch TV in the dayroom majority of evening.

## 2020-02-06 NOTE — PROGRESS NOTES
Patient requested and received vistaril 50 mg po for complaint of itching. Will monito for effectiveness.

## 2020-02-07 PROCEDURE — 99233 SBSQ HOSP IP/OBS HIGH 50: CPT | Performed by: PSYCHIATRY & NEUROLOGY

## 2020-02-07 PROCEDURE — 6370000000 HC RX 637 (ALT 250 FOR IP): Performed by: PSYCHIATRY & NEUROLOGY

## 2020-02-07 PROCEDURE — 1240000000 HC EMOTIONAL WELLNESS R&B

## 2020-02-07 RX ORDER — VENLAFAXINE HYDROCHLORIDE 75 MG/1
150 CAPSULE, EXTENDED RELEASE ORAL
Status: DISCONTINUED | OUTPATIENT
Start: 2020-02-08 | End: 2020-02-10 | Stop reason: HOSPADM

## 2020-02-07 RX ORDER — VENLAFAXINE HYDROCHLORIDE 75 MG/1
75 CAPSULE, EXTENDED RELEASE ORAL
Status: DISCONTINUED | OUTPATIENT
Start: 2020-02-08 | End: 2020-02-07

## 2020-02-07 RX ORDER — PRAZOSIN HYDROCHLORIDE 1 MG/1
1 CAPSULE ORAL 2 TIMES DAILY
Status: DISCONTINUED | OUTPATIENT
Start: 2020-02-07 | End: 2020-02-10 | Stop reason: HOSPADM

## 2020-02-07 RX ADMIN — PRAZOSIN HYDROCHLORIDE 1 MG: 1 CAPSULE ORAL at 12:32

## 2020-02-07 RX ADMIN — PRAZOSIN HYDROCHLORIDE 1 MG: 1 CAPSULE ORAL at 20:48

## 2020-02-07 RX ADMIN — ESCITALOPRAM OXALATE 20 MG: 10 TABLET ORAL at 10:16

## 2020-02-07 RX ADMIN — HYDROXYZINE PAMOATE 50 MG: 50 CAPSULE ORAL at 12:32

## 2020-02-07 RX ADMIN — IBUPROFEN 400 MG: 400 TABLET, FILM COATED ORAL at 10:16

## 2020-02-07 RX ADMIN — OLANZAPINE 20 MG: 10 TABLET, FILM COATED ORAL at 21:10

## 2020-02-07 RX ADMIN — BUSPIRONE HYDROCHLORIDE 10 MG: 10 TABLET ORAL at 10:16

## 2020-02-07 RX ADMIN — HYDROXYZINE PAMOATE 50 MG: 50 CAPSULE ORAL at 03:03

## 2020-02-07 RX ADMIN — TRAZODONE HYDROCHLORIDE 50 MG: 50 TABLET ORAL at 20:48

## 2020-02-07 RX ADMIN — IBUPROFEN 400 MG: 400 TABLET, FILM COATED ORAL at 19:23

## 2020-02-07 RX ADMIN — HYDROXYZINE PAMOATE 50 MG: 50 CAPSULE ORAL at 19:23

## 2020-02-07 ASSESSMENT — PAIN DESCRIPTION - PAIN TYPE: TYPE: ACUTE PAIN

## 2020-02-07 ASSESSMENT — PAIN SCALES - GENERAL
PAINLEVEL_OUTOF10: 9
PAINLEVEL_OUTOF10: 8

## 2020-02-07 ASSESSMENT — PAIN DESCRIPTION - LOCATION: LOCATION: HEAD

## 2020-02-07 ASSESSMENT — PAIN DESCRIPTION - DESCRIPTORS: DESCRIPTORS: HEADACHE

## 2020-02-07 NOTE — PROGRESS NOTES
1:1 Assessment 10 minutes. Patient is alert and oriented x 4. Uses direct eye contact and is dressed appropriately in street clothing. Patient denied SI/HI,A/V hallucinations. Patient did attend groups this shift and participated appropriately. Patient is medication compliant. Judgement,insight and impulse control are limited. Patient denied any physical complaints this evening. Vital signs are noted. Safety checks continue Q 15 minutes throughout the shift. PRNS this shift? Trazodone 50 mg po for sleep = s/w effective. Patient obtained 3.75 hours of sleep this shift.

## 2020-02-07 NOTE — PLAN OF CARE
Problem: Altered Mood, Psychotic Behavior:  Goal: Able to verbalize reality based thinking  Description  Able to verbalize reality based thinking  Outcome: Ongoing  Note:   Denies SI/HI/AVH. Reports anxiety. Isolated to room much of day.

## 2020-02-07 NOTE — BH NOTE
Pt was given Vistaril 50 mg po for anxiety and sleeplessness at 0303. Pt has been awake quite a while since, but is now currently sleeping.
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
## 2020-02-07 NOTE — PLAN OF CARE
Problem: Depressive Behavior With or Without Suicide Precautions:  Goal: Able to verbalize acceptance of life and situations over which he or she has no control  Description  Able to verbalize acceptance of life and situations over which he or she has no control  2/7/2020 0117 by Caleen Denver, RN  Outcome: Not Met This Shift  Little communication between what is going on with him and his situation that brought him into the hospital.Continue to encourage dialogue about his situation, continue to offer him support. Goal: Absence of self-harm  Description  Absence of self-harm  2/7/2020 0117 by Caleen Denver, RN  Outcome: Ongoing  No episodes of self harm this evening. Patient said that if he was going to hurt himself or had thoughts of doing so, he would come to the staff. Problem: Altered Mood, Manic Behavior:  Goal: Able to sleep  Description  Able to sleep  2/7/2020 0117 by Caleen Denver, RN  Outcome: Ongoing  Sleep continues to be a struggle with patient will continue to offer sleep medications and discuss sleep hygiene with patient. Goal: Able to verbalize decrease in frequency and intensity of racing thoughts  Description  Able to verbalize decrease in frequency and intensity of racing thoughts  2/7/2020 0117 by Caleen Denver, RN  Outcome: Ongoing  Thoughts aren't racing, patient is medication compliant. Hallucinations have dissipated, denied complaints tonight, will continue to  monitor patient. Problem: Altered Mood, Psychotic Behavior:  Goal: Able to verbalize decrease in frequency and intensity of hallucinations  Description  Able to verbalize decrease in frequency and intensity of hallucinations  2/7/2020 0117 by Caleen Denver, RN  Outcome: Completed  Patient denied A/V hallucinations, stated he hasn't had any for a day or so.

## 2020-02-07 NOTE — GROUP NOTE
Group Therapy Note    Date: 2/6/2020    Group Start Time: 2015  Group End Time: 2045  Group Topic: Wrap-Up    [facility]        Group Therapy Note    Attendees: goals and importance of goal setting discussed. Night time milieu activities discussed.          Patient's Goal:  Stay positive     Notes:  Successful     Status After Intervention:  Improved    Participation Level: Interactive    Participation Quality: Appropriate and Sharing      Speech:  normal      Thought Process/Content: Logical      Affective Functioning: Congruent      Mood: dysphoric      Level of consciousness:  Alert and Oriented x4      Response to Learning: Progressing to goal      Endings: None Reported    Modes of Intervention: Support      Discipline Responsible: "[employer]      Signature:  Shorty Pompa

## 2020-02-07 NOTE — GROUP NOTE
Group Therapy Note    Date: 2/7/2020    Group Start Time: 1100  Group End Time: 3601  Group Topic: Psychotherapy    [facility]    Azalia Richard [facility]        Group Therapy Note    Attendees: 10        Patient's Goal:  Pt will improve interpersonal interactions through group processing and agenda setting.      Notes:  Pt participated in group by listening.      Status After Intervention:  Unchanged    Participation Level: Minimal    Participation Quality: Resistant      Speech:  hesitant      Thought Process/Content: Perseverating      Affective Functioning: Congruent      Mood: irritable      Level of consciousness:  Alert      Response to Learning: Able to verbalize current knowledge/experience      Endings: None Reported    Modes of Intervention: Education, Support, Socialization, Exploration, Clarifying, Problem-solving, Activity, Movement, Confrontation, Limit-setting and Reality-testing      Discipline Responsible: /Counselor      Signature:  Azalia Richard, [facility]

## 2020-02-07 NOTE — PLAN OF CARE
Nutrition Problem: Inadequate oral intake  Intervention: Food and/or Nutrient Delivery: Continue current diet, Start ONS  Nutritional Goals: patient will consume at least 50% of his meals on general diet order x 3 meals per day + 50% or greater of Ensure High-Protein with meals

## 2020-02-07 NOTE — PROGRESS NOTES
Nutrition Assessment    Type and Reason for Visit: Initial(LOS x 5 days )    Nutrition Recommendations:   1. Continue general diet order - please document meal intake % in flow sheets (Input/Ouput tab) for all meals. 2. Added Ensure High-Protein with meals - please document ONS intake % in flow sheets. 3. Monitor appetite, po intake, and mental status. 4. Please obtain an actual, current weight for this patient - standing scale is preferred. 5. Monitor bowel function and nutrition-related labs. Nutrition Assessment: patient is nutritionally compromised AEB decreased appetite/po intake r/t debilitating depression and he is at ris for further compromise d/t ongoing decreased appetite/po intake and minimal improvement (patient reported worsening) during admission; will continue general diet order and add Ensure High-Protein with meals    Malnutrition Assessment:  · Malnutrition Status: At risk for malnutrition  · Context: Chronic illness  · Findings of the 6 clinical characteristics of malnutrition (Minimum of 2 out of 6 clinical characteristics is required to make the diagnosis of moderate or severe Protein Calorie Malnutrition based on AND/ASPEN Guidelines):  1. Energy Intake-Less than or equal to 50% of estimated energy requirement, Greater than or equal to 5 days    2. Weight Loss-Unable to assess, in 1 week  3. Fat Loss-Unable to assess,    4. Muscle Loss-Unable to assess,    5. Fluid Accumulation-No significant fluid accumulation,    6.  Strength-Not measured    Nutrition Risk Level:  Moderate    Nutrient Needs:  · Estimated Daily Total Kcal: 1820 - 2002 kcals based on 20-22 kcals/kg/CBW  · Estimated Daily Protein (g): 90 - 98 g protein based on 1.2-1.3 g/kg/IBW  · Estimated Daily Total Fluid (ml/day): 1800 - 2000 ml     Nutrition Diagnosis:   · Problem: Inadequate oral intake  · Etiology: related to Insufficient energy/nutrient consumption, Psychological cause/life stress     Signs and symptoms:  as evidenced by Patient report of, Diet history of poor intake, Other (Comment)(mental health decline since being released from half-way ~ 1 year ago)    Objective Information:  · Nutrition-Focused Physical Findings: patient c/o his debilitating depression keeping him from participating in group sessions and/or interacting with his peers in common area; patient is A & O x 3; + poor sleep and decreased po intake continues; skin color is appropriate for ethnicity; no difficulties chewing and/or swallowing noted  · Wound Type: None  · Current Nutrition Therapies:  · Oral Diet Orders: General   · Oral Diet intake: Unable to assess(no po intake data recorded in flow sheets)  · Oral Nutrition Supplement (ONS) Orders: None  · ONS intake: (none ordered)  · Anthropometric Measures:  · Ht: 5' 10\" (177.8 cm)   · Current Body Wt: 200 lb (90.7 kg)(stated weight )  · Admission Body Wt: 200 lb (90.7 kg)(stated weight)  · Usual Body Wt: (175-180#, per past encounters; unable to accurately assess though because past weights are stated)  · Weight Change:  unable to assess - admission weight is stated and not other weights have been obtained x 5 days admission  · Ideal Body Wt: 166 lb (75.3 kg), % Ideal Body 120%  · BMI Classification: BMI 25.0 - 29.9 Overweight    Nutrition Interventions:   Continue current diet, Start ONS  Continued Inpatient Monitoring, Coordination of Care, Coordination of Community Care    Nutrition Evaluation:   · Evaluation: Goals set   · Goals: patient will consume at least 50% of his meals on general diet order x 3 meals per day + 50% or greater of Ensure High-Protein with meals     · Monitoring: Meal Intake, Supplement Intake, Diet Tolerance, Mental Status/Confusion, Weight, Pertinent Labs, Monitor Bowel Function      Electronically signed by Anastasiia Alarcon RD, LD on 2/7/20 at 2:52 PM    Contact Number: [phone]

## 2020-02-08 PROCEDURE — 6370000000 HC RX 637 (ALT 250 FOR IP): Performed by: PSYCHIATRY & NEUROLOGY

## 2020-02-08 PROCEDURE — 1240000000 HC EMOTIONAL WELLNESS R&B

## 2020-02-08 PROCEDURE — 99233 SBSQ HOSP IP/OBS HIGH 50: CPT | Performed by: PSYCHIATRY & NEUROLOGY

## 2020-02-08 RX ORDER — NICOTINE 21 MG/24HR
1 PATCH, TRANSDERMAL 24 HOURS TRANSDERMAL DAILY PRN
Status: DISCONTINUED | OUTPATIENT
Start: 2020-02-08 | End: 2020-02-10 | Stop reason: HOSPADM

## 2020-02-08 RX ADMIN — PRAZOSIN HYDROCHLORIDE 1 MG: 1 CAPSULE ORAL at 08:31

## 2020-02-08 RX ADMIN — IBUPROFEN 400 MG: 400 TABLET, FILM COATED ORAL at 14:07

## 2020-02-08 RX ADMIN — PRAZOSIN HYDROCHLORIDE 1 MG: 1 CAPSULE ORAL at 20:54

## 2020-02-08 RX ADMIN — IBUPROFEN 400 MG: 400 TABLET, FILM COATED ORAL at 21:22

## 2020-02-08 RX ADMIN — HYDROXYZINE PAMOATE 50 MG: 50 CAPSULE ORAL at 14:07

## 2020-02-08 RX ADMIN — VENLAFAXINE HYDROCHLORIDE 150 MG: 75 CAPSULE, EXTENDED RELEASE ORAL at 08:31

## 2020-02-08 RX ADMIN — OLANZAPINE 20 MG: 10 TABLET, FILM COATED ORAL at 20:56

## 2020-02-08 RX ADMIN — TRAZODONE HYDROCHLORIDE 50 MG: 50 TABLET ORAL at 23:13

## 2020-02-08 ASSESSMENT — PAIN SCALES - GENERAL
PAINLEVEL_OUTOF10: 6
PAINLEVEL_OUTOF10: 8
PAINLEVEL_OUTOF10: 8

## 2020-02-08 ASSESSMENT — PAIN DESCRIPTION - PAIN TYPE: TYPE: ACUTE PAIN

## 2020-02-08 ASSESSMENT — PAIN DESCRIPTION - DESCRIPTORS: DESCRIPTORS: HEADACHE

## 2020-02-08 ASSESSMENT — PAIN DESCRIPTION - LOCATION: LOCATION: HEAD

## 2020-02-08 NOTE — BH NOTE
Time: 4:15-5:00      Type of Group: wellness      Level of Participation: reluctantly & minimally engaged

## 2020-02-08 NOTE — PLAN OF CARE
Patient was out with patient group, early in the shift & stayed to self . Patient was verbal in 1:1 & stated he was here for high anxiety. Patient stated that his anxiety was an 8/10. Patient denied having hallucinations, with no delusions or paranoia noted. Patient did report having a high pitch sound in his left ear for the past 3 months.  Jenn Joe R.N.

## 2020-02-08 NOTE — PROGRESS NOTES
52.5 % Final    MCV 02/01/2020 95.7  80.0 - 100.0 fL Final    MCH 02/01/2020 32.3  26.0 - 34.0 pg Final    MCHC 02/01/2020 33.7  31.0 - 36.0 g/dL Final    RDW 02/01/2020 13.6  12.4 - 15.4 % Final    Platelets 21/11/2256 239  135 - 450 K/uL Final    MPV 02/01/2020 9.1  5.0 - 10.5 fL Final    Neutrophils % 02/01/2020 73.5  % Final    Lymphocytes % 02/01/2020 19.8  % Final    Monocytes % 02/01/2020 5.3  % Final    Eosinophils % 02/01/2020 0.9  % Final    Basophils % 02/01/2020 0.5  % Final    Neutrophils Absolute 02/01/2020 12.1* 1.7 - 7.7 K/uL Final    Lymphocytes Absolute 02/01/2020 3.3  1.0 - 5.1 K/uL Final    Monocytes Absolute 02/01/2020 0.9  0.0 - 1.3 K/uL Final    Eosinophils Absolute 02/01/2020 0.1  0.0 - 0.6 K/uL Final    Basophils Absolute 02/01/2020 0.1  0.0 - 0.2 K/uL Final    Sodium 02/01/2020 137  136 - 145 mmol/L Final    Potassium 02/01/2020 3.6  3.5 - 5.1 mmol/L Final    Chloride 02/01/2020 102  99 - 110 mmol/L Final    CO2 02/01/2020 22  21 - 32 mmol/L Final    Anion Gap 02/01/2020 13  3 - 16 Final    Glucose 02/01/2020 98  70 - 99 mg/dL Final    BUN 02/01/2020 13  7 - 20 mg/dL Final    CREATININE 02/01/2020 0.8* 0.9 - 1.3 mg/dL Final    GFR Non- 02/01/2020 >60  >60 Final    Comment: >60 mL/min/1.73m2 EGFR, calc. for ages 25 and older using the  MDRD formula (not corrected for weight), is valid for stable  renal function.  GFR  02/01/2020 >60  >60 Final    Comment: Chronic Kidney Disease: less than 60 ml/min/1.73 sq.m. Kidney Failure: less than 15 ml/min/1.73 sq.m. Results valid for patients 18 years and older.       Calcium 02/01/2020 8.9  8.3 - 10.6 mg/dL Final    Total Protein 02/01/2020 6.8  6.4 - 8.2 g/dL Final    Alb 02/01/2020 4.5  3.4 - 5.0 g/dL Final    Albumin/Globulin Ratio 02/01/2020 2.0  1.1 - 2.2 Final    Total Bilirubin 02/01/2020 <0.2  0.0 - 1.0 mg/dL Final    Alkaline Phosphatase 02/01/2020 95  40 - 129 U/L Final    ALT 02/01/2020 27  10 - 40 U/L Final    AST 02/01/2020 22  15 - 37 U/L Final    Globulin 02/01/2020 2.3  g/dL Final    Amphetamine Screen, Urine 02/01/2020 Neg  Negative <1000ng/mL Final    Barbiturate Screen, Ur 02/01/2020 Neg  Negative <200 ng/mL Final    Benzodiazepine Screen, Urine 02/01/2020 Neg  Negative <200 ng/mL Final    Cannabinoid Scrn, Ur 02/01/2020 POSITIVE* Negative <50 ng/mL Final    Cocaine Metabolite Screen, Urine 02/01/2020 Neg  Negative <300 ng/mL Final    Opiate Scrn, Ur 02/01/2020 Neg  Negative <300 ng/mL Final    Comment: \"Therapeutic levels of pain medication, especially oxycontin and synthetic  opioids, may not be detected by this Methodology. Pain management screen  panel  Drug panel-PM-Hi Res Ur, Interp (PAIN) should be considered for drug  monitoring \".  PCP Screen, Urine 02/01/2020 Neg  Negative <25 ng/mL Final    Methadone Screen, Urine 02/01/2020 Neg  Negative <300 ng/mL Final    Propoxyphene Scrn, Ur 02/01/2020 Neg  Negative <300 ng/mL Final    Oxycodone Urine 02/01/2020 POSITIVE* Negative <100 ng/ml Final    pH, UA 02/01/2020 5.0   Final    Comment: Urine pH less than 5.0 or greater than 8.0 may indicate sample adulteration. Another sample should be collected if clinically  indicated.  Drug Screen Comment: 02/01/2020 see below   Final    Comment: This method is a screening test to detect only these drug  classes as part of a medical workup. Confirmatory testing  by another method should be ordered if clinically indicated.       Color, UA 02/01/2020 Yellow  Straw/Yellow Final    Clarity, UA 02/01/2020 Clear  Clear Final    Glucose, Ur 02/01/2020 Negative  Negative mg/dL Final    Bilirubin Urine 02/01/2020 Negative  Negative Final    Ketones, Urine 02/01/2020 Negative  Negative mg/dL Final    Specific Gravity, UA 02/01/2020 1.025  1.005 - 1.030 Final    Blood, Urine 02/01/2020 Negative  Negative Final    pH, UA 02/01/2020 6.0  5.0 - 8.0 Final    Protein, UA 02/01/2020 Negative  Negative mg/dL Final    Urobilinogen, Urine 02/01/2020 0.2  <2.0 E.U./dL Final    Nitrite, Urine 02/01/2020 Negative  Negative Final    Leukocyte Esterase, Urine 02/01/2020 Negative  Negative Final    Microscopic Examination 02/01/2020 Not Indicated   Final    Urine Type 02/01/2020 NotGiven   Final    Urine received in a container without preservatives.     Urine Reflex to Culture 02/01/2020 Not Indicated   Final    TSH 02/02/2020 1.11  0.27 - 4.20 uIU/mL Final    Cholesterol, Total 02/03/2020 183  0 - 199 mg/dL Final    Triglycerides 02/03/2020 140  0 - 150 mg/dL Final    HDL 02/03/2020 27* 40 - 60 mg/dL Final    LDL Calculated 02/03/2020 128* <100 mg/dL Final    VLDL Cholesterol Calculated 02/03/2020 28  Not Established mg/dL Final    WBC 02/03/2020 7.4  4.0 - 11.0 K/uL Final    RBC 02/03/2020 5.19  4.20 - 5.90 M/uL Final    Hemoglobin 02/03/2020 16.6  13.5 - 17.5 g/dL Final    Hematocrit 02/03/2020 49.5  40.5 - 52.5 % Final    MCV 02/03/2020 95.3  80.0 - 100.0 fL Final    MCH 02/03/2020 31.9  26.0 - 34.0 pg Final    MCHC 02/03/2020 33.5  31.0 - 36.0 g/dL Final    RDW 02/03/2020 13.6  12.4 - 15.4 % Final    Platelets 67/10/4525 207  135 - 450 K/uL Final    MPV 02/03/2020 9.5  5.0 - 10.5 fL Final    Neutrophils % 02/03/2020 60.5  % Final    Lymphocytes % 02/03/2020 31.1  % Final    Monocytes % 02/03/2020 5.5  % Final    Eosinophils % 02/03/2020 2.2  % Final    Basophils % 02/03/2020 0.7  % Final    Neutrophils Absolute 02/03/2020 4.5  1.7 - 7.7 K/uL Final    Lymphocytes Absolute 02/03/2020 2.3  1.0 - 5.1 K/uL Final    Monocytes Absolute 02/03/2020 0.4  0.0 - 1.3 K/uL Final    Eosinophils Absolute 02/03/2020 0.2  0.0 - 0.6 K/uL Final    Basophils Absolute 02/03/2020 0.1  0.0 - 0.2 K/uL Final    Hemoglobin A1C 02/03/2020 4.8  See comment % Final    Comment: Comment:  Diagnosis of Diabetes: > or = 6.5%  Increased risk of diabetes (Prediabetes):

## 2020-02-08 NOTE — BH NOTE
Client was medication complaint. Reports waking up off and on during the night. Clients reports eating breakfast. Affect is flat.  Will continue to assess

## 2020-02-08 NOTE — GROUP NOTE
Group Therapy Note    Date: 2/7/2020    Group Start Time: 2045  Group End Time: 2115  Group Topic: Wrap-Up    [facility]        Group Therapy Note    Attendees: goals and importance of goal setting discussed. Night time milieu activities discussed. Patient's Goal:  Stay positive and go to groups    Notes:  Successful     Status After Intervention:  Improved    Participation Level:  Active Listener    Participation Quality: Appropriate      Speech:  normal      Thought Process/Content: Logical      Affective Functioning: Flat      Mood: anxious      Level of consciousness:  Alert and Oriented x4      Response to Learning: Progressing to goal      Endings: None Reported    Modes of Intervention: Support      Discipline Responsible: [address] [facility] [place] [employer]      Signature:  Adriano Robles

## 2020-02-09 PROCEDURE — 6370000000 HC RX 637 (ALT 250 FOR IP): Performed by: PSYCHIATRY & NEUROLOGY

## 2020-02-09 PROCEDURE — 1240000000 HC EMOTIONAL WELLNESS R&B

## 2020-02-09 RX ADMIN — IBUPROFEN 400 MG: 400 TABLET, FILM COATED ORAL at 16:36

## 2020-02-09 RX ADMIN — PRAZOSIN HYDROCHLORIDE 1 MG: 1 CAPSULE ORAL at 20:48

## 2020-02-09 RX ADMIN — OLANZAPINE 20 MG: 10 TABLET, FILM COATED ORAL at 20:48

## 2020-02-09 RX ADMIN — PRAZOSIN HYDROCHLORIDE 1 MG: 1 CAPSULE ORAL at 10:47

## 2020-02-09 RX ADMIN — VENLAFAXINE HYDROCHLORIDE 150 MG: 75 CAPSULE, EXTENDED RELEASE ORAL at 10:47

## 2020-02-09 RX ADMIN — HYDROXYZINE PAMOATE 50 MG: 50 CAPSULE ORAL at 10:47

## 2020-02-09 RX ADMIN — TRAZODONE HYDROCHLORIDE 50 MG: 50 TABLET ORAL at 21:09

## 2020-02-09 ASSESSMENT — PAIN SCALES - GENERAL: PAINLEVEL_OUTOF10: 4

## 2020-02-09 NOTE — BH NOTE
Received Ibuprofen 400 mg PO for aid in decreasing discomfort in left ear. Also reported high pitch ringing in the left ear and he had received an ATB that didn't seem to help.

## 2020-02-09 NOTE — GROUP NOTE
Group Therapy Note    Date: 2/8/2020    Group Start Time: 2030  Group End Time: 2100  Group Topic: [facility]        Group Therapy Note    Attendees: goals and importance of goal setting discussed. Night time milieu activities discussed.          Patient's Goal:  Find out about d/c from dr    Notes:  Successful     Status After Intervention:  Improved    Participation Level: Interactive    Participation Quality: Appropriate      Speech:  normal      Thought Process/Content: Linear      Affective Functioning: Flat      Mood: dysphoric      Level of consciousness:  Oriented x4 and Drowsy      Response to Learning: Progressing to goal      Endings: None Reported    Modes of Intervention: Support      Discipline Responsible: [employer]      Signature:  Rosa Dye

## 2020-02-10 VITALS
HEIGHT: 70 IN | HEART RATE: 84 BPM | SYSTOLIC BLOOD PRESSURE: 180 MMHG | OXYGEN SATURATION: 98 % | RESPIRATION RATE: 16 BRPM | DIASTOLIC BLOOD PRESSURE: 102 MMHG | WEIGHT: 200 LBS | TEMPERATURE: 97.9 F | BODY MASS INDEX: 28.63 KG/M2

## 2020-02-10 PROCEDURE — 5130000000 HC BRIDGE APPOINTMENT

## 2020-02-10 PROCEDURE — 6370000000 HC RX 637 (ALT 250 FOR IP): Performed by: PSYCHIATRY & NEUROLOGY

## 2020-02-10 PROCEDURE — 99239 HOSP IP/OBS DSCHRG MGMT >30: CPT | Performed by: PSYCHIATRY & NEUROLOGY

## 2020-02-10 RX ORDER — VENLAFAXINE HYDROCHLORIDE 150 MG/1
150 CAPSULE, EXTENDED RELEASE ORAL
Qty: 30 CAPSULE | Refills: 0 | Status: SHIPPED | OUTPATIENT
Start: 2020-02-11 | End: 2020-06-05

## 2020-02-10 RX ORDER — PRAZOSIN HYDROCHLORIDE 1 MG/1
1 CAPSULE ORAL 2 TIMES DAILY
Qty: 60 CAPSULE | Refills: 0 | Status: ON HOLD | OUTPATIENT
Start: 2020-02-10 | End: 2020-06-10 | Stop reason: HOSPADM

## 2020-02-10 RX ORDER — OLANZAPINE 20 MG/1
20 TABLET ORAL NIGHTLY
Qty: 30 TABLET | Refills: 0 | Status: SHIPPED | OUTPATIENT
Start: 2020-02-10 | End: 2020-06-05

## 2020-02-10 RX ADMIN — VENLAFAXINE HYDROCHLORIDE 150 MG: 75 CAPSULE, EXTENDED RELEASE ORAL at 09:01

## 2020-02-10 RX ADMIN — IBUPROFEN 400 MG: 400 TABLET, FILM COATED ORAL at 10:44

## 2020-02-10 RX ADMIN — PRAZOSIN HYDROCHLORIDE 1 MG: 1 CAPSULE ORAL at 09:01

## 2020-02-10 ASSESSMENT — PAIN SCALES - GENERAL: PAINLEVEL_OUTOF10: 7

## 2020-02-10 NOTE — BH NOTE
[facility]  Discharge Note    Pt discharged with followings belongings:   Dentures: None  Vision - Corrective Lenses: None(no glasses with him)  Hearing Aid: None  Jewelry: None  Body Piercings Removed: N/A  Clothing: Pants, Footwear, Jacket / coat, Other (Comment)(footwear is boots 2 ,hoodies with strings, large coat, )  Were All Patient Medications Collected?: Not Applicable  Other Valuables: Cell phone, [employer] Press, Other (Comment)(one [employer] uiuhoo, Two net spend cards, wallet, ID,  lighter,  cell phone, key in safe)   Valuables sent home with pt. Valuables retrieved from safe and returned to patient. Patient education on aftercare instructions: yes   Patient verbalize understanding of AVS:  yes.     Status EXAM upon discharge:  Status and Exam  Normal: No  Facial Expression: Flat, Worried  Affect: Blunt  Level of Consciousness: Alert  Mood:Normal: No  Mood: Depressed, Anxious  Motor Activity:Normal: Yes  Motor Activity: Decreased  Interview Behavior: Cooperative  Preception: Dixon to Person, Kari Shrestha to Time, Dixon to Place, Dixon to Situation  Attention:Normal: No  Attention: Unable to Concentrate  Thought Processes: Circumstantial  Thought Content:Normal: No  Thought Content: Poverty of Content, Preoccupations  Hallucinations: None  Delusions: No  Delusions: Persecution  Memory:Normal: Yes  Memory: Poor Recent, Poor Remote  Insight and Judgment: No  Insight and Judgment: Poor Judgment, Poor Insight  Present Suicidal Ideation: No  Present Homicidal Ideation: No      Metabolic Screening:    Lab Results   Component Value Date    LABA1C 4.8 02/03/2020       Lab Results   Component Value Date    CHOL 183 02/03/2020     Lab Results   Component Value Date    TRIG 140 02/03/2020     Lab Results   Component Value Date    HDL 27 (L) 02/03/2020     No components found for: [facility] EVALUATION AND TREATMENT [place]  Lab Results   Component Value Date    LABVLDL 28 02/03/2020       Alisa Sahu RN

## 2020-02-10 NOTE — PLAN OF CARE
Problem: Depressive Behavior With or Without Suicide Precautions:  Goal: Able to verbalize acceptance of life and situations over which he or she has no control  Description  Able to verbalize acceptance of life and situations over which he or she has no control  2/10/2020 0032 by Lore Philippe RN  Outcome: Ongoing    Pt denies all - he stated that he is tired tonight and just wants to get out of here. Difficulty sleeping, PRN medication provided.

## 2020-02-10 NOTE — DISCHARGE SUMMARY
recall the first time or what he did, but states that the second time he cut his wrist (did not require medical attention), he can't remember when he did this but states that it was in 2019. Pt states that he just can't go on anymore with this anxiety, he is tired of not being able to hold down a job or leave his house\"    Hospital Course Pt was admitted for worsening depression and inc anxiety that has been debilitating pt's functioning at home. Pt was started on lexapro and seroquel initially but pt did not tolerate seroquel and there was no response on lexapro. Pt was switch to zyprexa, effexor and prazosin. Pt tolerated this combination and experienced improvements of his sx's initially pt was isolated and by end of admission pt was more talkative and attending grps. Pt had uneventful hospital course. Patient appears to be in stable condition and close to their baseline functioning. The patient denies suicidal or homicidal ideations and is showing future orientation. Patient no longer presented an imminent risk of danger to themselves and/or others. At the time of discharge it appears that the patient has received the maximum medical benefit from this hospitalization and can be appropriately managed with community treatment.      PE: (reviewed) and labs (see medical H&PE)  Labs:    Admission on 02/01/2020, Discharged on 02/10/2020   Component Date Value Ref Range Status    Ethanol Lvl 02/01/2020 None Detected  mg/dL Final    Comment:    None Detected  Conversion factor:  100 mg/dl = .100 g/dl  For Medical Purposes Only      Salicylate, Serum 50/88/3069 <0.3* 15.0 - 30.0 mg/dL Final    Comment: Therapeutic Range: 15.0-30.0 mg/dL  Toxic: >30.0 mg/dL      Acetaminophen Level 02/01/2020 <5* 10 - 30 ug/mL Final    Comment: Therapeutic Range: 10.0-30.0 ug/mL  Toxic: >=150 ug/mL      WBC 02/01/2020 16.5* 4.0 - 11.0 K/uL Final    RBC 02/01/2020 5.16  4.20 - 5.90 M/uL Final    Hemoglobin 02/01/2020 16.7  13.5 - 17.5 g/dL Final    Hematocrit 02/01/2020 49.4  40.5 - 52.5 % Final    MCV 02/01/2020 95.7  80.0 - 100.0 fL Final    MCH 02/01/2020 32.3  26.0 - 34.0 pg Final    MCHC 02/01/2020 33.7  31.0 - 36.0 g/dL Final    RDW 02/01/2020 13.6  12.4 - 15.4 % Final    Platelets 27/57/5312 239  135 - 450 K/uL Final    MPV 02/01/2020 9.1  5.0 - 10.5 fL Final    Neutrophils % 02/01/2020 73.5  % Final    Lymphocytes % 02/01/2020 19.8  % Final    Monocytes % 02/01/2020 5.3  % Final    Eosinophils % 02/01/2020 0.9  % Final    Basophils % 02/01/2020 0.5  % Final    Neutrophils Absolute 02/01/2020 12.1* 1.7 - 7.7 K/uL Final    Lymphocytes Absolute 02/01/2020 3.3  1.0 - 5.1 K/uL Final    Monocytes Absolute 02/01/2020 0.9  0.0 - 1.3 K/uL Final    Eosinophils Absolute 02/01/2020 0.1  0.0 - 0.6 K/uL Final    Basophils Absolute 02/01/2020 0.1  0.0 - 0.2 K/uL Final    Sodium 02/01/2020 137  136 - 145 mmol/L Final    Potassium 02/01/2020 3.6  3.5 - 5.1 mmol/L Final    Chloride 02/01/2020 102  99 - 110 mmol/L Final    CO2 02/01/2020 22  21 - 32 mmol/L Final    Anion Gap 02/01/2020 13  3 - 16 Final    Glucose 02/01/2020 98  70 - 99 mg/dL Final    BUN 02/01/2020 13  7 - 20 mg/dL Final    CREATININE 02/01/2020 0.8* 0.9 - 1.3 mg/dL Final    GFR Non- 02/01/2020 >60  >60 Final    Comment: >60 mL/min/1.73m2 EGFR, calc. for ages 25 and older using the  MDRD formula (not corrected for weight), is valid for stable  renal function.  GFR  02/01/2020 >60  >60 Final    Comment: Chronic Kidney Disease: less than 60 ml/min/1.73 sq.m. Kidney Failure: less than 15 ml/min/1.73 sq.m. Results valid for patients 18 years and older.       Calcium 02/01/2020 8.9  8.3 - 10.6 mg/dL Final    Total Protein 02/01/2020 6.8  6.4 - 8.2 g/dL Final    Alb 02/01/2020 4.5  3.4 - 5.0 g/dL Final    Albumin/Globulin Ratio 02/01/2020 2.0  1.1 - 2.2 Final    Total Bilirubin 02/01/2020 <0.2  0.0 - 1.0 mg/dL Final Final    pH, UA 02/01/2020 6.0  5.0 - 8.0 Final    Protein, UA 02/01/2020 Negative  Negative mg/dL Final    Urobilinogen, Urine 02/01/2020 0.2  <2.0 E.U./dL Final    Nitrite, Urine 02/01/2020 Negative  Negative Final    Leukocyte Esterase, Urine 02/01/2020 Negative  Negative Final    Microscopic Examination 02/01/2020 Not Indicated   Final    Urine Type 02/01/2020 NotGiven   Final    Urine received in a container without preservatives.     Urine Reflex to Culture 02/01/2020 Not Indicated   Final    TSH 02/02/2020 1.11  0.27 - 4.20 uIU/mL Final    Cholesterol, Total 02/03/2020 183  0 - 199 mg/dL Final    Triglycerides 02/03/2020 140  0 - 150 mg/dL Final    HDL 02/03/2020 27* 40 - 60 mg/dL Final    LDL Calculated 02/03/2020 128* <100 mg/dL Final    VLDL Cholesterol Calculated 02/03/2020 28  Not Established mg/dL Final    WBC 02/03/2020 7.4  4.0 - 11.0 K/uL Final    RBC 02/03/2020 5.19  4.20 - 5.90 M/uL Final    Hemoglobin 02/03/2020 16.6  13.5 - 17.5 g/dL Final    Hematocrit 02/03/2020 49.5  40.5 - 52.5 % Final    MCV 02/03/2020 95.3  80.0 - 100.0 fL Final    MCH 02/03/2020 31.9  26.0 - 34.0 pg Final    MCHC 02/03/2020 33.5  31.0 - 36.0 g/dL Final    RDW 02/03/2020 13.6  12.4 - 15.4 % Final    Platelets 65/75/8842 207  135 - 450 K/uL Final    MPV 02/03/2020 9.5  5.0 - 10.5 fL Final    Neutrophils % 02/03/2020 60.5  % Final    Lymphocytes % 02/03/2020 31.1  % Final    Monocytes % 02/03/2020 5.5  % Final    Eosinophils % 02/03/2020 2.2  % Final    Basophils % 02/03/2020 0.7  % Final    Neutrophils Absolute 02/03/2020 4.5  1.7 - 7.7 K/uL Final    Lymphocytes Absolute 02/03/2020 2.3  1.0 - 5.1 K/uL Final    Monocytes Absolute 02/03/2020 0.4  0.0 - 1.3 K/uL Final    Eosinophils Absolute 02/03/2020 0.2  0.0 - 0.6 K/uL Final    Basophils Absolute 02/03/2020 0.1  0.0 - 0.2 K/uL Final    Hemoglobin A1C 02/03/2020 4.8  See comment % Final    Comment: Comment:  Diagnosis of Diabetes: > or = 6.5%  Increased risk of diabetes (Prediabetes): 5.7-6.4%  Glycemic Control: Nonpregnant Adults: <7.0%                    Pregnant: <6.0%        eAG 02/03/2020 91.1  mg/dL Final        Mental Status Exam at Discharge:  Level of consciousness:  awake  Appearance:  well-appearing, in chair, good grooming and good hygiene well-developed, well-nourished  Behavior/Motor:  no abnormalities noted normal gait and station AIMS: 0  Attitude toward examiner:  cooperative, attentive and good eye contact  Speech:  spontaneous, normal rate, normal volume and well articulated  Mood:  dysthymic  Affect:  mood congruent Anxiety: mild  Hallucinations: Absent  Thought processes:  coherent Attention span, Concentration & Attention:  attention span and concentration were age appropriate  Thought content:  Reality based no evidence of delusions OCD: none    Insight: normal insight and judgment Cognition:  oriented to person, place, and time  Fund of Knowledge: average  IQ:average Memory: intact  Suicide:  No specific plan to harm self  Sleep: sleeps through the night  Appetite: ok   Reassess Alison Risk:  no specific plan to harm self Pt has phone numbers to contact if suicidal thoughts recur and states pt will return to the hospital if suicidal feelings return.    Hospital Routine Meds:     prazosin  1 mg Oral BID    venlafaxine  150 mg Oral Daily with breakfast    OLANZapine  20 mg Oral Nightly      Hospital PRN Meds: nicotine, nicotine polacrilex, ibuprofen, magnesium hydroxide, traZODone, hydrOXYzine   Discharge Meds:    Discharge Medication List as of 2/10/2020 11:06 AM           Details   venlafaxine (EFFEXOR XR) 150 MG extended release capsule Take 1 capsule by mouth daily (with breakfast), Disp-30 capsule, R-0Normal      prazosin (MINIPRESS) 1 MG capsule Take 1 capsule by mouth 2 times daily, Disp-60 capsule, R-0Normal      OLANZapine (ZYPREXA) 20 MG tablet Take 1 tablet by mouth nightly, Disp-30 tablet, R-0Normal

## 2020-02-10 NOTE — GROUP NOTE
Group Therapy Note    Date: 2/10/2020    Group Start Time: 1000  Group End Time: 9120  Group T[facility]: [address] [facility]        Group Therapy Note    Attendees: 11         Patient's Goal:  Patient will complete worksheet on boundaries and will discuss how they apply to mental wellbeing. Notes:  Patient attended group. Completed the worksheet and discussed in group. Verbalized a basic understanding of boundaries and talked about problems experienced in the past with poor boundaries. Status After Intervention:  Improved    Participation Level:  Active Listener and Interactive    Participation Quality: Appropriate and Attentive    Speech:  normal    Thought Process/Content: Logical    Affective Functioning: Congruent    Mood: anxious, depressed     Level of consciousness:  Oriented x4    Response to Learning: Able to verbalize current knowledge/experience and Able to verbalize/acknowledge new learning    Endings: None Reported    Modes of Intervention: Education, Support, Socialization and Exploration    Discipline Responsible: /Counselor    Signature:  Deni Holm [facility]

## 2020-06-05 ENCOUNTER — HOSPITAL ENCOUNTER (INPATIENT)
Age: 45
LOS: 6 days | Discharge: HOME | DRG: 756 | End: 2020-06-11
Attending: EMERGENCY MEDICINE | Admitting: PSYCHIATRY & NEUROLOGY
Payer: MEDICAID

## 2020-06-05 PROBLEM — F39 MOOD DISORDER (HCC): Status: ACTIVE | Noted: 2020-06-05

## 2020-06-05 LAB
A/G RATIO: 1.8 (ref 1.1–2.2)
ACETAMINOPHEN LEVEL: <5 UG/ML (ref 10–30)
ALBUMIN SERPL-MCNC: 4.2 G/DL (ref 3.4–5)
ALP BLD-CCNC: 91 U/L (ref 40–129)
ALT SERPL-CCNC: 31 U/L (ref 10–40)
AMPHETAMINE SCREEN, URINE: ABNORMAL
ANION GAP SERPL CALCULATED.3IONS-SCNC: 13 MMOL/L (ref 3–16)
AST SERPL-CCNC: 17 U/L (ref 15–37)
BARBITURATE SCREEN URINE: ABNORMAL
BASOPHILS ABSOLUTE: 0.1 K/UL (ref 0–0.2)
BASOPHILS RELATIVE PERCENT: 0.7 %
BENZODIAZEPINE SCREEN, URINE: ABNORMAL
BILIRUB SERPL-MCNC: 0.4 MG/DL (ref 0–1)
BUN BLDV-MCNC: 12 MG/DL (ref 7–20)
CALCIUM SERPL-MCNC: 8.9 MG/DL (ref 8.3–10.6)
CANNABINOID SCREEN URINE: POSITIVE
CHLORIDE BLD-SCNC: 102 MMOL/L (ref 99–110)
CO2: 20 MMOL/L (ref 21–32)
COCAINE METABOLITE SCREEN URINE: ABNORMAL
CREAT SERPL-MCNC: 0.8 MG/DL (ref 0.9–1.3)
EOSINOPHILS ABSOLUTE: 0.1 K/UL (ref 0–0.6)
EOSINOPHILS RELATIVE PERCENT: 1.3 %
ETHANOL: NORMAL MG/DL (ref 0–0.08)
GFR AFRICAN AMERICAN: >60
GFR NON-AFRICAN AMERICAN: >60
GLOBULIN: 2.4 G/DL
GLUCOSE BLD-MCNC: 128 MG/DL (ref 70–99)
HCT VFR BLD CALC: 51 % (ref 40.5–52.5)
HEMOGLOBIN: 17.5 G/DL (ref 13.5–17.5)
LYMPHOCYTES ABSOLUTE: 2.4 K/UL (ref 1–5.1)
LYMPHOCYTES RELATIVE PERCENT: 21.6 %
Lab: ABNORMAL
MCH RBC QN AUTO: 32.8 PG (ref 26–34)
MCHC RBC AUTO-ENTMCNC: 34.2 G/DL (ref 31–36)
MCV RBC AUTO: 96 FL (ref 80–100)
METHADONE SCREEN, URINE: ABNORMAL
MONOCYTES ABSOLUTE: 0.5 K/UL (ref 0–1.3)
MONOCYTES RELATIVE PERCENT: 4.6 %
NEUTROPHILS ABSOLUTE: 7.8 K/UL (ref 1.7–7.7)
NEUTROPHILS RELATIVE PERCENT: 71.8 %
OPIATE SCREEN URINE: ABNORMAL
OXYCODONE URINE: ABNORMAL
PDW BLD-RTO: 13.6 % (ref 12.4–15.4)
PH UA: 5
PHENCYCLIDINE SCREEN URINE: ABNORMAL
PLATELET # BLD: 246 K/UL (ref 135–450)
PMV BLD AUTO: 9 FL (ref 5–10.5)
POTASSIUM REFLEX MAGNESIUM: 3.7 MMOL/L (ref 3.5–5.1)
PROPOXYPHENE SCREEN: ABNORMAL
RBC # BLD: 5.32 M/UL (ref 4.2–5.9)
SALICYLATE, SERUM: 0.6 MG/DL (ref 15–30)
SARS-COV-2, NAAT: NOT DETECTED
SODIUM BLD-SCNC: 135 MMOL/L (ref 136–145)
TOTAL PROTEIN: 6.6 G/DL (ref 6.4–8.2)
WBC # BLD: 10.9 K/UL (ref 4–11)

## 2020-06-05 PROCEDURE — 85025 COMPLETE CBC W/AUTO DIFF WBC: CPT

## 2020-06-05 PROCEDURE — G0480 DRUG TEST DEF 1-7 CLASSES: HCPCS

## 2020-06-05 PROCEDURE — 1240000000 HC EMOTIONAL WELLNESS R&B

## 2020-06-05 PROCEDURE — U0002 COVID-19 LAB TEST NON-CDC: HCPCS

## 2020-06-05 PROCEDURE — 80307 DRUG TEST PRSMV CHEM ANLYZR: CPT

## 2020-06-05 PROCEDURE — 99285 EMERGENCY DEPT VISIT HI MDM: CPT

## 2020-06-05 PROCEDURE — 80053 COMPREHEN METABOLIC PANEL: CPT

## 2020-06-05 PROCEDURE — 6370000000 HC RX 637 (ALT 250 FOR IP): Performed by: PSYCHIATRY & NEUROLOGY

## 2020-06-05 RX ORDER — OLANZAPINE 10 MG/1
10 INJECTION, POWDER, LYOPHILIZED, FOR SOLUTION INTRAMUSCULAR
Status: ACTIVE | OUTPATIENT
Start: 2020-06-05 | End: 2020-06-05

## 2020-06-05 RX ORDER — CARVEDILOL 6.25 MG/1
12.5 TABLET ORAL 2 TIMES DAILY WITH MEALS
Status: DISCONTINUED | OUTPATIENT
Start: 2020-06-05 | End: 2020-06-05

## 2020-06-05 RX ORDER — IBUPROFEN 400 MG/1
400 TABLET ORAL EVERY 6 HOURS PRN
Status: DISCONTINUED | OUTPATIENT
Start: 2020-06-05 | End: 2020-06-06

## 2020-06-05 RX ORDER — TRAZODONE HYDROCHLORIDE 50 MG/1
50 TABLET ORAL NIGHTLY PRN
Status: DISCONTINUED | OUTPATIENT
Start: 2020-06-05 | End: 2020-06-11 | Stop reason: HOSPADM

## 2020-06-05 RX ORDER — RISPERIDONE 1 MG/1
1 TABLET, FILM COATED ORAL 3 TIMES DAILY
Status: DISCONTINUED | OUTPATIENT
Start: 2020-06-05 | End: 2020-06-11 | Stop reason: HOSPADM

## 2020-06-05 RX ORDER — PRAZOSIN HYDROCHLORIDE 1 MG/1
1 CAPSULE ORAL NIGHTLY
Status: DISCONTINUED | OUTPATIENT
Start: 2020-06-05 | End: 2020-06-11 | Stop reason: HOSPADM

## 2020-06-05 RX ORDER — MAGNESIUM HYDROXIDE/ALUMINUM HYDROXICE/SIMETHICONE 120; 1200; 1200 MG/30ML; MG/30ML; MG/30ML
30 SUSPENSION ORAL EVERY 6 HOURS PRN
Status: DISCONTINUED | OUTPATIENT
Start: 2020-06-05 | End: 2020-06-11 | Stop reason: HOSPADM

## 2020-06-05 RX ORDER — RISPERIDONE 1 MG/1
1 TABLET, FILM COATED ORAL 3 TIMES DAILY
Status: ON HOLD | COMMUNITY
Start: 2020-05-19 | End: 2020-06-10 | Stop reason: HOSPADM

## 2020-06-05 RX ORDER — OLANZAPINE 10 MG/1
10 TABLET ORAL
Status: ACTIVE | OUTPATIENT
Start: 2020-06-05 | End: 2020-06-05

## 2020-06-05 RX ORDER — PRAZOSIN HYDROCHLORIDE 1 MG/1
1 CAPSULE ORAL NIGHTLY
COMMUNITY
Start: 2020-05-19 | End: 2020-06-05

## 2020-06-05 RX ORDER — NICOTINE 21 MG/24HR
1 PATCH, TRANSDERMAL 24 HOURS TRANSDERMAL DAILY
Status: DISCONTINUED | OUTPATIENT
Start: 2020-06-05 | End: 2020-06-11 | Stop reason: HOSPADM

## 2020-06-05 RX ORDER — METOPROLOL SUCCINATE 50 MG/1
50 TABLET, EXTENDED RELEASE ORAL DAILY
Status: ON HOLD | COMMUNITY
Start: 2020-05-27 | End: 2020-06-10 | Stop reason: HOSPADM

## 2020-06-05 RX ORDER — ATOMOXETINE 40 MG/1
40 CAPSULE ORAL DAILY
Status: ON HOLD | COMMUNITY
Start: 2020-03-23 | End: 2020-06-10 | Stop reason: HOSPADM

## 2020-06-05 RX ORDER — METOPROLOL SUCCINATE 25 MG/1
25 TABLET, EXTENDED RELEASE ORAL DAILY
Status: DISCONTINUED | OUTPATIENT
Start: 2020-06-05 | End: 2020-06-11 | Stop reason: HOSPADM

## 2020-06-05 RX ORDER — HYDROXYZINE PAMOATE 50 MG/1
50 CAPSULE ORAL 3 TIMES DAILY PRN
Status: DISCONTINUED | OUTPATIENT
Start: 2020-06-05 | End: 2020-06-11 | Stop reason: HOSPADM

## 2020-06-05 RX ORDER — CARVEDILOL 12.5 MG/1
12.5 TABLET ORAL 2 TIMES DAILY
Status: ON HOLD | COMMUNITY
Start: 2020-05-19 | End: 2020-06-10 | Stop reason: HOSPADM

## 2020-06-05 RX ADMIN — IBUPROFEN 400 MG: 400 TABLET, FILM COATED ORAL at 18:07

## 2020-06-05 RX ADMIN — PRAZOSIN HYDROCHLORIDE 1 MG: 1 CAPSULE ORAL at 21:52

## 2020-06-05 RX ADMIN — RISPERIDONE 1 MG: 1 TABLET ORAL at 21:52

## 2020-06-05 ASSESSMENT — PATIENT HEALTH QUESTIONNAIRE - PHQ9: SUM OF ALL RESPONSES TO PHQ QUESTIONS 1-9: 18

## 2020-06-05 ASSESSMENT — LIFESTYLE VARIABLES: HISTORY_ALCOHOL_USE: NO

## 2020-06-05 ASSESSMENT — SLEEP AND FATIGUE QUESTIONNAIRES
DIFFICULTY ARISING: YES
DIFFICULTY FALLING ASLEEP: NO
DO YOU HAVE DIFFICULTY SLEEPING: YES
DIFFICULTY STAYING ASLEEP: YES
SLEEP PATTERN: DISTURBED/INTERRUPTED SLEEP
RESTFUL SLEEP: YES
DO YOU USE A SLEEP AID: YES
AVERAGE NUMBER OF SLEEP HOURS: 4

## 2020-06-05 ASSESSMENT — PAIN SCALES - GENERAL
PAINLEVEL_OUTOF10: 0
PAINLEVEL_OUTOF10: 4
PAINLEVEL_OUTOF10: 4

## 2020-06-05 NOTE — ED PROVIDER NOTES
Take 1 capsule by mouth 2 times daily    VENLAFAXINE (EFFEXOR XR) 150 MG EXTENDED RELEASE CAPSULE    Take 1 capsule by mouth daily (with breakfast)       ALLERGIES     Topamax [topiramate];  Vicodin [hydrocodone-acetaminophen]; and Moxifloxacin    FAMILY HISTORY       Family History   Problem Relation Age of Onset    Heart Failure Mother     Heart Disease Mother           SOCIAL HISTORY       Social History     Socioeconomic History    Marital status: Single     Spouse name: None    Number of children: 0    Years of education: None    Highest education level: None   Occupational History    Occupation: unemployed   Social Needs    Financial resource strain: None    Food insecurity     Worry: None     Inability: None    Transportation needs     Medical: None     Non-medical: None   Tobacco Use    Smoking status: Current Every Day Smoker     Packs/day: 1.00     Years: 10.00     Pack years: 10.00     Types: Cigarettes    Smokeless tobacco: Never Used   Substance and Sexual Activity    Alcohol use: No    Drug use: Yes     Types: Marijuana     Comment: 3-4 weekly    Sexual activity: None     Comment: not asked   Lifestyle    Physical activity     Days per week: None     Minutes per session: None    Stress: None   Relationships    Social connections     Talks on phone: None     Gets together: None     Attends Synagogue service: None     Active member of club or organization: None     Attends meetings of clubs or organizations: None     Relationship status: None    Intimate partner violence     Fear of current or ex partner: None     Emotionally abused: None     Physically abused: None     Forced sexual activity: None   Other Topics Concern    None   Social History Narrative    None       SCREENINGS               PHYSICAL EXAM    (up to 7 for level 4, 8 or more for level 5)     ED Triage Vitals   BP Temp Temp src Pulse Resp SpO2 Height Weight   06/05/20 1115 06/05/20 1114 -- 06/05/20 1114 06/05/20 1114 06/05/20 1114 06/05/20 1114 06/05/20 1114   (!) 162/92 97.5 °F (36.4 °C)  97 18 99 % 5' 10\" (1.778 m) 200 lb (90.7 kg)       Physical Exam    General appearance:  Cooperative. No acute distress. Skin:  Warm. Dry. Eye:  Extraocular movements intact. Ears, nose, mouth and throat:  Oral mucosa moist,  Neck:  Trachea midline. Heart:  Regular rate and rhythm  Perfusion:  intact  Respiratory:  Lungs clear to auscultation bilaterally. Respirations nonlabored. Abdominal:   Non distended. Nontender  Neurological:  Alert and oriented x 3. Moves all extremities spontaneously  Musculoskeletal:   Normal ROM, no deformities          Psychiatric: Depressed mood with flat affect.   Endorses auditory hallucinations and passive suicidal ideations      DIAGNOSTIC RESULTS       Labs Reviewed   CBC WITH AUTO DIFFERENTIAL - Abnormal; Notable for the following components:       Result Value    Neutrophils Absolute 7.8 (*)     All other components within normal limits    Narrative:     Performed at:  [place] [address],  [place], [facility]   Phone [phone]   COMPREHENSIVE METABOLIC PANEL W/ REFLEX TO MG FOR LOW K - Abnormal; Notable for the following components:    Sodium 135 (*)     CO2 20 (*)     Glucose 128 (*)     CREATININE 0.8 (*)     All other components within normal limits    Narrative:     Performed at:  [facility]) - [place] [address],  [place], [facility]   Phone [phone]   [address] - Abnormal; Notable for the following components:    Cannabinoid Scrn, Ur POSITIVE (*)     All other components within normal limits    Narrative:     Performed at:  [facility]) [facility] [address],  [place], [facility]   Phone [phone]   SALICYLATE LEVEL - Abnormal; Notable for the following components:    Salicylate, Serum 0.6 (*)     All other components within normal limits    Narrative: Performed at:  [facility] [address],  [place], [facility]   Phone [phone]   ACETAMINOPHEN LEVEL - Abnormal; Notable for the following components:    Acetaminophen Level <5 (*)     All other components within normal limits    Narrative:     Performed at:  [facility] [address],  [place], [facility]   Phone [phone]   ETHANOL    Narrative:     Performed at:  [facility]) [facility] [address],  [place], [facility]   Phone [phone]       Interpretation per the Radiologist below, if obtained/available at the time of this note:    No orders to display       All other labs/imaging were within normal range or not returned as of this dictation. EMERGENCY DEPARTMENT COURSE and DIFFERENTIAL DIAGNOSIS/MDM:   Vitals:    Vitals:    06/05/20 1114 06/05/20 1115   BP:  (!) 162/92   Pulse: 97    Resp: 18    Temp: 97.5 °F (36.4 °C)    SpO2: 99%    Weight: 200 lb (90.7 kg)    Height: 5' 10\" (1.778 m)        Patient presents emergency department today with worsening auditory hallucinations and suicidal ideations. Vital signs stable. Exam unremarkable. Medical work-up unremarkable. Has been medically clear for psychiatric disposition. Addendum 1424: Patient has been admitted to the hospital by psychiatry  MDM    CONSULTS     None    Critical Care:   None    REASSESSMENT          PROCEDURE     Unless otherwise noted below, none     Procedures      FINAL IMPRESSION      1. Suicidal ideations    2. Auditory hallucination            DISPOSITION/PLAN   DISPOSITION Ed Observation 06/05/2020 12:18:11 PM        PATIENT REFERRED TO:  No follow-up provider specified. DISCHARGE MEDICATIONS:  New Prescriptions    No medications on file     Controlled Substances Monitoring:     RX Monitoring 12/4/2017   Attestation The Prescription Monitoring Report for this patient was reviewed today.    Periodic Controlled

## 2020-06-05 NOTE — ED NOTES
Pt resting in bed with eyes closed at this time. No s/s of pain or distress, respirations easy and even. Will continue to monitor.       Radha Limon RN  06/05/20 0203

## 2020-06-06 PROBLEM — G89.29 CHRONIC MIDLINE LOW BACK PAIN WITHOUT SCIATICA: Status: ACTIVE | Noted: 2020-06-06

## 2020-06-06 PROBLEM — M54.50 CHRONIC MIDLINE LOW BACK PAIN WITHOUT SCIATICA: Status: ACTIVE | Noted: 2020-06-06

## 2020-06-06 PROBLEM — I10 ESSENTIAL HYPERTENSION: Status: ACTIVE | Noted: 2020-06-06

## 2020-06-06 PROBLEM — F33.3 SEVERE EPISODE OF RECURRENT MAJOR DEPRESSIVE DISORDER, WITH PSYCHOTIC FEATURES (HCC): Status: ACTIVE | Noted: 2020-06-05

## 2020-06-06 PROCEDURE — 99222 1ST HOSP IP/OBS MODERATE 55: CPT | Performed by: PHYSICIAN ASSISTANT

## 2020-06-06 PROCEDURE — 1240000000 HC EMOTIONAL WELLNESS R&B

## 2020-06-06 PROCEDURE — 99223 1ST HOSP IP/OBS HIGH 75: CPT | Performed by: PSYCHIATRY & NEUROLOGY

## 2020-06-06 PROCEDURE — 6370000000 HC RX 637 (ALT 250 FOR IP): Performed by: PSYCHIATRY & NEUROLOGY

## 2020-06-06 RX ORDER — CYCLOBENZAPRINE HCL 10 MG
10 TABLET ORAL 4 TIMES DAILY PRN
Status: DISCONTINUED | OUTPATIENT
Start: 2020-06-06 | End: 2020-06-11 | Stop reason: HOSPADM

## 2020-06-06 RX ORDER — IBUPROFEN 800 MG/1
800 TABLET ORAL EVERY 6 HOURS PRN
Status: DISCONTINUED | OUTPATIENT
Start: 2020-06-06 | End: 2020-06-11 | Stop reason: HOSPADM

## 2020-06-06 RX ORDER — ACETAMINOPHEN 325 MG/1
650 TABLET ORAL EVERY 4 HOURS PRN
Status: DISCONTINUED | OUTPATIENT
Start: 2020-06-06 | End: 2020-06-11 | Stop reason: HOSPADM

## 2020-06-06 RX ORDER — DULOXETIN HYDROCHLORIDE 30 MG/1
30 CAPSULE, DELAYED RELEASE ORAL DAILY
Status: DISCONTINUED | OUTPATIENT
Start: 2020-06-06 | End: 2020-06-08

## 2020-06-06 RX ADMIN — PRAZOSIN HYDROCHLORIDE 1 MG: 1 CAPSULE ORAL at 19:54

## 2020-06-06 RX ADMIN — DULOXETINE HYDROCHLORIDE 30 MG: 30 CAPSULE, DELAYED RELEASE ORAL at 14:10

## 2020-06-06 RX ADMIN — IBUPROFEN 400 MG: 400 TABLET, FILM COATED ORAL at 12:49

## 2020-06-06 RX ADMIN — TRAZODONE HYDROCHLORIDE 50 MG: 50 TABLET ORAL at 22:56

## 2020-06-06 RX ADMIN — METOPROLOL SUCCINATE 25 MG: 25 TABLET, EXTENDED RELEASE ORAL at 10:12

## 2020-06-06 RX ADMIN — RISPERIDONE 1 MG: 1 TABLET ORAL at 14:10

## 2020-06-06 RX ADMIN — RISPERIDONE 1 MG: 1 TABLET ORAL at 19:54

## 2020-06-06 RX ADMIN — RISPERIDONE 1 MG: 1 TABLET ORAL at 10:11

## 2020-06-06 ASSESSMENT — PAIN SCALES - GENERAL
PAINLEVEL_OUTOF10: 0
PAINLEVEL_OUTOF10: 0
PAINLEVEL_OUTOF10: 6
PAINLEVEL_OUTOF10: 0

## 2020-06-06 ASSESSMENT — SLEEP AND FATIGUE QUESTIONNAIRES
SLEEP PATTERN: DIFFICULTY FALLING ASLEEP;NIGHTMARES/TERRORS;DISTURBED/INTERRUPTED SLEEP
DIFFICULTY ARISING: YES
DIFFICULTY FALLING ASLEEP: YES
DO YOU HAVE DIFFICULTY SLEEPING: YES
DO YOU USE A SLEEP AID: NO
AVERAGE NUMBER OF SLEEP HOURS: 4
DIFFICULTY STAYING ASLEEP: YES
RESTFUL SLEEP: NO

## 2020-06-06 ASSESSMENT — LIFESTYLE VARIABLES: HISTORY_ALCOHOL_USE: NO

## 2020-06-06 ASSESSMENT — PATIENT HEALTH QUESTIONNAIRE - PHQ9: SUM OF ALL RESPONSES TO PHQ QUESTIONS 1-9: 27

## 2020-06-06 NOTE — PLAN OF CARE
Problem: Depressive Behavior With or Without Suicide Precautions:  Goal: Ability to disclose and discuss suicidal ideas will improve  Description: Ability to disclose and discuss suicidal ideas will improve  6/6/2020 1253 by Viviana De La Rosa RN  Outcome: Ongoing  Client has been reculsive to room and is evasive with staff. Reports headache, and was given Ibuprofen 400 mg. Client reports \" I feel the same. \"   Client denies SI/HI, however affect is flat and depressed.  Will continue to assess

## 2020-06-06 NOTE — H&P
Hospital Medicine History & Physical      PCP: Bradley Calderon MD    Date of Admission: 6/5/2020    Date of Service: Pt seen/examined on 6/6/2020    Chief Complaint:    Chief Complaint   Patient presents with    Suicidal     pt. states he is hearing voices telling him to kill himself. denies drug or etoh use within 24 hours         History Of Present Illness: The patient is a 54 y.o. male with a PMH of HTN who [facility] and SI. Patient was seen and evaluated in the ED by the ED medical provider, patient was medically cleared for [facility] to [facility]. This note serves as an admission medical H&P.   PCP: Daniel Artis MD  Tobacco Use: 1 ppd  EtOH Use: denies  Illicit Drug Use: denies, UDS + cannabis    Pt denies any medical concerns at this time. Past Medical History:        Diagnosis Date    Chronic back pain     Hypertension     Pneumonia     Tendonitis     left wrist       Past Surgical History:        Procedure Laterality Date    EYE SURGERY      \"lazy eye\"    FRACTURE SURGERY Left 12/12/2017    orif- Bosch's fracture    NASAL SEPTUM SURGERY      NOSE SURGERY  2014       Medications Prior to Admission:    Prior to Admission medications    Medication Sig Start Date End Date Taking? Authorizing Provider   carvedilol (COREG) 12.5 MG tablet Take 12.5 mg by mouth 2 times daily 5/19/20  Yes Historical Provider, MD   risperiDONE (RISPERDAL) 1 MG tablet Take 1 mg by mouth three times daily 5/19/20   Historical Provider, MD   metoprolol succinate (TOPROL XL) 50 MG extended release tablet Take 50 mg by mouth daily 5/27/20   Historical Provider, MD   atomoxetine (STRATTERA) 40 MG capsule Take 40 mg by mouth daily 3/23/20   Historical Provider, MD   prazosin (MINIPRESS) 1 MG capsule Take 1 capsule by mouth 2 times daily 2/10/20   Warren Potter MD       Allergies:  Topamax [topiramate];  Vicodin [hydrocodone-acetaminophen]; and Moxifloxacin    Social History:

## 2020-06-06 NOTE — H&P
[address]                 [address] [place] [address]                              HISTORY AND PHYSICAL    PATIENT NAME: Stewart Vieira                      :        1975  MED REC NO:   [id number]                          ROOM:       2317  ACCOUNT NO:   [de-identified]                           ADMIT DATE: 2020  PROVIDER:     Bri Cunningham MD    IDENTIFICATION:  This is a recently homeless, never , unemployed  25-year-old with a history of mood and psychotic symptoms, and substance  use, who was brought in by a friend because of worsening depression and  suicidal thinking. SOURCES OF INFORMATION:  The patient. ED record. Previous admission. CHIEF COMPLAINT:  \"Mood swings and thoughts of suicide. \"    HISTORY OF PRESENT ILLNESS:  The patient reports that it has been a long  time since he last felt like himself. He says that his mood has gotten  much worse over the last few months and includes anhedonia, amotivation,  poor concentration, trouble sleeping, decreased appetite, self-reproach,  hopelessness, and suicidality. He also reports hearing a male's voice  Coming from inside his head that which sometimes telling him to  kill himself. Because of his worsening mood and auditory  hallucinations, his friend brought in him for assessment. He says that he occasionally uses cannabis, but no other substances. PSYCHIATRIC REVIEW OF SYSTEMS:  No symptoms of ortiz. STRESSORS:  Worsening chronic pain, migraines, COVID-19, limited social  supports, newly homeless, no financial means. PSYCHIATRIC HISTORY:  He reports he was hospitalized here 6 months ago  and then at [facility]. Our note shows he was here in 2020. On  that admission, he describes worsening anxiety, flashbacks and inability  to function.   Collateral information from his sister from that admission  shows that she hadn't heard from him for a while SYSTEMS:  He reports chronic pain and migraines. He did not  describe or endorse recent changes in vision, shortness of breath,  cough, sore throat, abdominal pain, bleeding problems, or skin problems. He walked with a painful gait. MENTAL STATUS EXAMINATION:  The patient was in personal clothes. He was  guarded. He provided little information. He had no eye contact. He  described his mood as \"terrible\" and had a blunted affect. He had mild  psychomotor retardation. He spoke softly. He was not pressured. He was oriented to the date,  day, place and the context of this evaluation. He was able to track and  sustain conversation without difficulty. His use of language, speech, and educational attainment suggested a  below average level of intellectual functioning. His thought processes were organized though he did not completely engage  in conversation. He described auditory hallucinations that include some  command-type hallucinations to suicide. He also endorses suicidality,  but reported feeling safe here and willing to approach staff with  concerns. He does not have homicidal thinking. He did not endorse or  describe delusional thinking. His ability for abstract thought was impaired based on his  interpretation of simple proverbs. His insight and judgment were impaired. PHYSICAL EXAMINATION:  VITAL SIGNS:  Temperature 98.3, pulse 84, respiratory rate 16, blood  pressure 140/89. GAIT:  Painful gait. LABORATORY DATA:  Shows a CMP with a sodium at 135, CO2 at 20, blood  glucose at 128, otherwise within normal limits. TSH within normal  limits. Ethanol level not detectable. Urine drug screen positive for  cannabis. Acetaminophen and salicylate levels below threshold. CBC  within normal limits. COVID-19 negative.     FORMULATION:  This is a recently homeless, never , unemployed  68-year-old with a history of mood and psychotic symptoms, who was  brought in by his friend after the patient expressed suicidality. The  patient meets criteria for major depressive episode, severe with  psychotic features. Given the significance of his symptoms and  suicidality, he requires inpatient stabilization and treatment. DIAGNOSES:  1.  Major depressive disorder, severe, with psychotic features. 2.  Rule out PTSD. 3.  Cannabis use disorder, moderate, in a controlled environment. 4.  Chronic low back pain. 5.  Migraines. 6.  Hypertension. PLAN:  1. Admit to Inpatient Psychiatry for stabilization and treatment. 2.  start Cymbalta for treatment of depression. This may also  help with his chronic pain. continue Risperdal for now. Ordered every 15-minute checks for safety, programming, and p.r.n. medication for anxiety, agitation, and insomnia. 3.  Internal Medicine consult for admission. The patient has chronic  low back pain and hypertension. 4.  Collateral information for diagnostic clarification and care  coordination. 5.  The patient is willing to stay on a voluntary basis. Estimated  length of stay, 5 to 8 days. Seventy minutes were spent with the patient in completing this  evaluation and more than 50% of the time was spent in completing this  evaluation, providing counseling, and planning treatment with the  patient.         Trevon Black MD    D: 06/06/2020 13:07:56       T: 06/06/2020 13:19:00     [email or web address]  Job#: [id number]     Doc#: [id number]    CC:

## 2020-06-06 NOTE — PLAN OF CARE
Problem: Depressive Behavior With or Without Suicide Precautions:  Goal: Ability to disclose and discuss suicidal ideas will improve  Description: Ability to disclose and discuss suicidal ideas will improve  Outcome: Ongoing   Spent most of evening in room. Did not attend evening groups. Compliant with hs medications. Did not socialize with peers. Denied SI/HI/AVH.

## 2020-06-07 PROCEDURE — 6370000000 HC RX 637 (ALT 250 FOR IP): Performed by: PSYCHIATRY & NEUROLOGY

## 2020-06-07 PROCEDURE — 1240000000 HC EMOTIONAL WELLNESS R&B

## 2020-06-07 RX ADMIN — PRAZOSIN HYDROCHLORIDE 1 MG: 1 CAPSULE ORAL at 21:07

## 2020-06-07 RX ADMIN — METOPROLOL SUCCINATE 25 MG: 25 TABLET, EXTENDED RELEASE ORAL at 09:05

## 2020-06-07 RX ADMIN — CYCLOBENZAPRINE HYDROCHLORIDE 10 MG: 10 TABLET, FILM COATED ORAL at 16:35

## 2020-06-07 RX ADMIN — RISPERIDONE 1 MG: 1 TABLET ORAL at 21:06

## 2020-06-07 RX ADMIN — DULOXETINE HYDROCHLORIDE 30 MG: 30 CAPSULE, DELAYED RELEASE ORAL at 09:05

## 2020-06-07 RX ADMIN — RISPERIDONE 1 MG: 1 TABLET ORAL at 14:51

## 2020-06-07 RX ADMIN — RISPERIDONE 1 MG: 1 TABLET ORAL at 09:05

## 2020-06-07 ASSESSMENT — PAIN DESCRIPTION - ORIENTATION
ORIENTATION: LOWER
ORIENTATION: LOWER

## 2020-06-07 ASSESSMENT — PAIN - FUNCTIONAL ASSESSMENT
PAIN_FUNCTIONAL_ASSESSMENT: ACTIVITIES ARE NOT PREVENTED
PAIN_FUNCTIONAL_ASSESSMENT: ACTIVITIES ARE NOT PREVENTED

## 2020-06-07 ASSESSMENT — PAIN DESCRIPTION - LOCATION
LOCATION: BACK
LOCATION: BACK

## 2020-06-07 ASSESSMENT — PAIN SCALES - GENERAL
PAINLEVEL_OUTOF10: 8
PAINLEVEL_OUTOF10: 10

## 2020-06-07 ASSESSMENT — PAIN DESCRIPTION - ONSET: ONSET: AWAKENED FROM SLEEP

## 2020-06-07 ASSESSMENT — PAIN DESCRIPTION - FREQUENCY
FREQUENCY: CONTINUOUS
FREQUENCY: CONTINUOUS

## 2020-06-07 ASSESSMENT — PAIN DESCRIPTION - DESCRIPTORS
DESCRIPTORS: ACHING;CONSTANT
DESCRIPTORS: ACHING;CONSTANT

## 2020-06-07 ASSESSMENT — PAIN DESCRIPTION - PAIN TYPE
TYPE: CHRONIC PAIN
TYPE: CHRONIC PAIN

## 2020-06-07 ASSESSMENT — PAIN DESCRIPTION - PROGRESSION: CLINICAL_PROGRESSION: GRADUALLY IMPROVING

## 2020-06-08 PROBLEM — F43.10 PTSD (POST-TRAUMATIC STRESS DISORDER): Status: ACTIVE | Noted: 2020-06-08

## 2020-06-08 PROCEDURE — 99233 SBSQ HOSP IP/OBS HIGH 50: CPT | Performed by: PSYCHIATRY & NEUROLOGY

## 2020-06-08 PROCEDURE — 6370000000 HC RX 637 (ALT 250 FOR IP): Performed by: PSYCHIATRY & NEUROLOGY

## 2020-06-08 PROCEDURE — 1240000000 HC EMOTIONAL WELLNESS R&B

## 2020-06-08 RX ORDER — GABAPENTIN 300 MG/1
600 CAPSULE ORAL 3 TIMES DAILY
Status: DISCONTINUED | OUTPATIENT
Start: 2020-06-08 | End: 2020-06-11 | Stop reason: HOSPADM

## 2020-06-08 RX ORDER — DULOXETIN HYDROCHLORIDE 60 MG/1
60 CAPSULE, DELAYED RELEASE ORAL DAILY
Status: DISCONTINUED | OUTPATIENT
Start: 2020-06-09 | End: 2020-06-11 | Stop reason: HOSPADM

## 2020-06-08 RX ORDER — DULOXETIN HYDROCHLORIDE 30 MG/1
30 CAPSULE, DELAYED RELEASE ORAL ONCE
Status: COMPLETED | OUTPATIENT
Start: 2020-06-08 | End: 2020-06-08

## 2020-06-08 RX ADMIN — PRAZOSIN HYDROCHLORIDE 1 MG: 1 CAPSULE ORAL at 20:16

## 2020-06-08 RX ADMIN — RISPERIDONE 1 MG: 1 TABLET ORAL at 08:50

## 2020-06-08 RX ADMIN — DULOXETINE HYDROCHLORIDE 30 MG: 30 CAPSULE, DELAYED RELEASE ORAL at 12:08

## 2020-06-08 RX ADMIN — IBUPROFEN 800 MG: 800 TABLET, FILM COATED ORAL at 12:05

## 2020-06-08 RX ADMIN — CYCLOBENZAPRINE HYDROCHLORIDE 10 MG: 10 TABLET, FILM COATED ORAL at 20:20

## 2020-06-08 RX ADMIN — DULOXETINE HYDROCHLORIDE 30 MG: 30 CAPSULE, DELAYED RELEASE ORAL at 08:44

## 2020-06-08 RX ADMIN — RISPERIDONE 1 MG: 1 TABLET ORAL at 20:16

## 2020-06-08 RX ADMIN — GABAPENTIN 600 MG: 300 CAPSULE ORAL at 20:16

## 2020-06-08 RX ADMIN — CYCLOBENZAPRINE HYDROCHLORIDE 10 MG: 10 TABLET, FILM COATED ORAL at 12:05

## 2020-06-08 RX ADMIN — GABAPENTIN 600 MG: 300 CAPSULE ORAL at 14:14

## 2020-06-08 RX ADMIN — RISPERIDONE 1 MG: 1 TABLET ORAL at 14:14

## 2020-06-08 RX ADMIN — METOPROLOL SUCCINATE 25 MG: 25 TABLET, EXTENDED RELEASE ORAL at 08:43

## 2020-06-08 ASSESSMENT — PAIN SCALES - GENERAL: PAINLEVEL_OUTOF10: 9

## 2020-06-08 NOTE — PLAN OF CARE
Patient alert and oriented x 3. Patient rates Depression 8/10 and Anxiety 8/10. Patient denies SI/HI/V/H. Patient states; \"I am hearing voices, but I am not able to make out what they are saying. \" Patient visible on the milieu, but keeps to himself. Patient took HS medications. Patient ate a HS snack. Patient denies self harm. Patient resting quietly in bed with eyes closed. No c/o's voiced at present.

## 2020-06-08 NOTE — PLAN OF CARE
Problem: Suicide risk  Goal: Provide patient with safe environment  Outcome: Ongoing     Problem: Pain:  Goal: Pain level will decrease  Outcome: Ongoing     Problem: Pain:  Goal: Control of acute pain  Outcome: Ongoing     Problem: Pain:  Goal: Control of chronic pain  Outcome: Ongoing     Problem: Depressive Behavior With or Without Suicide Precautions:  Goal: Able to verbalize and/or display a decrease in depressive symptoms  Outcome: Ongoing     Problem: Depressive Behavior With or Without Suicide Precautions:  Goal: Ability to disclose and discuss suicidal ideas will improve  Outcome: Ongoing     Problem: Depressive Behavior With or Without Suicide Precautions:  Goal: Absence of self-harm  Outcome: Ongoing

## 2020-06-09 PROCEDURE — 99233 SBSQ HOSP IP/OBS HIGH 50: CPT | Performed by: PSYCHIATRY & NEUROLOGY

## 2020-06-09 PROCEDURE — 97535 SELF CARE MNGMENT TRAINING: CPT

## 2020-06-09 PROCEDURE — 97165 OT EVAL LOW COMPLEX 30 MIN: CPT

## 2020-06-09 PROCEDURE — 1240000000 HC EMOTIONAL WELLNESS R&B

## 2020-06-09 PROCEDURE — 6370000000 HC RX 637 (ALT 250 FOR IP): Performed by: PSYCHIATRY & NEUROLOGY

## 2020-06-09 RX ADMIN — TRAZODONE HYDROCHLORIDE 50 MG: 50 TABLET ORAL at 21:28

## 2020-06-09 RX ADMIN — RISPERIDONE 1 MG: 1 TABLET ORAL at 14:17

## 2020-06-09 RX ADMIN — IBUPROFEN 800 MG: 800 TABLET, FILM COATED ORAL at 09:14

## 2020-06-09 RX ADMIN — PRAZOSIN HYDROCHLORIDE 1 MG: 1 CAPSULE ORAL at 20:35

## 2020-06-09 RX ADMIN — CYCLOBENZAPRINE HYDROCHLORIDE 10 MG: 10 TABLET, FILM COATED ORAL at 21:28

## 2020-06-09 RX ADMIN — GABAPENTIN 600 MG: 300 CAPSULE ORAL at 14:17

## 2020-06-09 RX ADMIN — RISPERIDONE 1 MG: 1 TABLET ORAL at 20:35

## 2020-06-09 RX ADMIN — GABAPENTIN 600 MG: 300 CAPSULE ORAL at 20:35

## 2020-06-09 RX ADMIN — METOPROLOL SUCCINATE 25 MG: 25 TABLET, EXTENDED RELEASE ORAL at 09:14

## 2020-06-09 RX ADMIN — CYCLOBENZAPRINE HYDROCHLORIDE 10 MG: 10 TABLET, FILM COATED ORAL at 09:14

## 2020-06-09 RX ADMIN — IBUPROFEN 800 MG: 800 TABLET, FILM COATED ORAL at 18:02

## 2020-06-09 RX ADMIN — DULOXETINE HYDROCHLORIDE 60 MG: 60 CAPSULE, DELAYED RELEASE ORAL at 09:15

## 2020-06-09 RX ADMIN — RISPERIDONE 1 MG: 1 TABLET ORAL at 09:14

## 2020-06-09 RX ADMIN — CYCLOBENZAPRINE HYDROCHLORIDE 10 MG: 10 TABLET, FILM COATED ORAL at 18:02

## 2020-06-09 RX ADMIN — GABAPENTIN 600 MG: 300 CAPSULE ORAL at 09:15

## 2020-06-09 ASSESSMENT — PAIN DESCRIPTION - ONSET: ONSET: ON-GOING

## 2020-06-09 ASSESSMENT — PAIN SCALES - GENERAL
PAINLEVEL_OUTOF10: 4
PAINLEVEL_OUTOF10: 0
PAINLEVEL_OUTOF10: 9
PAINLEVEL_OUTOF10: 0
PAINLEVEL_OUTOF10: 9
PAINLEVEL_OUTOF10: 0

## 2020-06-09 ASSESSMENT — PAIN - FUNCTIONAL ASSESSMENT: PAIN_FUNCTIONAL_ASSESSMENT: ACTIVITIES ARE NOT PREVENTED

## 2020-06-09 ASSESSMENT — PAIN DESCRIPTION - ORIENTATION: ORIENTATION: LOWER

## 2020-06-09 ASSESSMENT — PAIN DESCRIPTION - PROGRESSION: CLINICAL_PROGRESSION: GRADUALLY IMPROVING

## 2020-06-09 ASSESSMENT — PAIN DESCRIPTION - DESCRIPTORS: DESCRIPTORS: ACHING

## 2020-06-09 ASSESSMENT — PAIN DESCRIPTION - PAIN TYPE: TYPE: CHRONIC PAIN

## 2020-06-09 ASSESSMENT — PAIN DESCRIPTION - LOCATION: LOCATION: BACK

## 2020-06-09 ASSESSMENT — PAIN DESCRIPTION - FREQUENCY: FREQUENCY: CONTINUOUS

## 2020-06-09 NOTE — PLAN OF CARE
Patient was out with patient group early in the shift & was social, with patients on the large side. . Patient  reported seeing vaporst & hearing voices, but could not make them out. Patient did not appear to responding to internal stimuli, during 1:1. No paranoia or delusions noted.   Sendy Joe R.N.

## 2020-06-10 VITALS
TEMPERATURE: 98 F | WEIGHT: 200 LBS | DIASTOLIC BLOOD PRESSURE: 91 MMHG | RESPIRATION RATE: 16 BRPM | BODY MASS INDEX: 28.63 KG/M2 | HEIGHT: 70 IN | HEART RATE: 91 BPM | OXYGEN SATURATION: 97 % | SYSTOLIC BLOOD PRESSURE: 163 MMHG

## 2020-06-10 PROCEDURE — 6370000000 HC RX 637 (ALT 250 FOR IP): Performed by: PSYCHIATRY & NEUROLOGY

## 2020-06-10 PROCEDURE — 97535 SELF CARE MNGMENT TRAINING: CPT

## 2020-06-10 PROCEDURE — 99233 SBSQ HOSP IP/OBS HIGH 50: CPT | Performed by: PSYCHIATRY & NEUROLOGY

## 2020-06-10 PROCEDURE — 1240000000 HC EMOTIONAL WELLNESS R&B

## 2020-06-10 RX ORDER — GABAPENTIN 300 MG/1
600 CAPSULE ORAL 3 TIMES DAILY
Qty: 84 CAPSULE | Refills: 0 | Status: SHIPPED | OUTPATIENT
Start: 2020-06-10 | End: 2020-09-23

## 2020-06-10 RX ORDER — DULOXETIN HYDROCHLORIDE 60 MG/1
60 CAPSULE, DELAYED RELEASE ORAL DAILY
Qty: 14 CAPSULE | Refills: 0 | Status: SHIPPED | OUTPATIENT
Start: 2020-06-11 | End: 2020-09-23

## 2020-06-10 RX ORDER — PRAZOSIN HYDROCHLORIDE 1 MG/1
1 CAPSULE ORAL NIGHTLY
Qty: 14 CAPSULE | Refills: 0 | Status: SHIPPED | OUTPATIENT
Start: 2020-06-10 | End: 2020-09-23

## 2020-06-10 RX ORDER — METOPROLOL SUCCINATE 25 MG/1
25 TABLET, EXTENDED RELEASE ORAL DAILY
Qty: 14 TABLET | Refills: 0 | Status: SHIPPED | OUTPATIENT
Start: 2020-06-11 | End: 2020-09-23

## 2020-06-10 RX ORDER — RISPERIDONE 1 MG/1
1 TABLET, FILM COATED ORAL 3 TIMES DAILY
Qty: 42 TABLET | Refills: 0 | Status: SHIPPED | OUTPATIENT
Start: 2020-06-10 | End: 2020-09-23

## 2020-06-10 RX ADMIN — IBUPROFEN 800 MG: 800 TABLET, FILM COATED ORAL at 12:27

## 2020-06-10 RX ADMIN — RISPERIDONE 1 MG: 1 TABLET ORAL at 20:42

## 2020-06-10 RX ADMIN — IBUPROFEN 800 MG: 800 TABLET, FILM COATED ORAL at 23:45

## 2020-06-10 RX ADMIN — METOPROLOL SUCCINATE 25 MG: 25 TABLET, EXTENDED RELEASE ORAL at 08:24

## 2020-06-10 RX ADMIN — PRAZOSIN HYDROCHLORIDE 1 MG: 1 CAPSULE ORAL at 20:42

## 2020-06-10 RX ADMIN — DULOXETINE HYDROCHLORIDE 60 MG: 60 CAPSULE, DELAYED RELEASE ORAL at 08:24

## 2020-06-10 RX ADMIN — HYDROXYZINE PAMOATE 50 MG: 50 CAPSULE ORAL at 23:45

## 2020-06-10 RX ADMIN — TRAZODONE HYDROCHLORIDE 50 MG: 50 TABLET ORAL at 20:42

## 2020-06-10 RX ADMIN — HYDROXYZINE PAMOATE 50 MG: 50 CAPSULE ORAL at 05:03

## 2020-06-10 RX ADMIN — RISPERIDONE 1 MG: 1 TABLET ORAL at 14:15

## 2020-06-10 RX ADMIN — RISPERIDONE 1 MG: 1 TABLET ORAL at 08:24

## 2020-06-10 RX ADMIN — GABAPENTIN 600 MG: 300 CAPSULE ORAL at 14:15

## 2020-06-10 RX ADMIN — GABAPENTIN 600 MG: 300 CAPSULE ORAL at 08:23

## 2020-06-10 RX ADMIN — CYCLOBENZAPRINE HYDROCHLORIDE 10 MG: 10 TABLET, FILM COATED ORAL at 20:42

## 2020-06-10 RX ADMIN — GABAPENTIN 600 MG: 300 CAPSULE ORAL at 20:42

## 2020-06-10 ASSESSMENT — PAIN SCALES - GENERAL
PAINLEVEL_OUTOF10: 0
PAINLEVEL_OUTOF10: 9
PAINLEVEL_OUTOF10: 0
PAINLEVEL_OUTOF10: 0
PAINLEVEL_OUTOF10: 7
PAINLEVEL_OUTOF10: 0

## 2020-06-10 NOTE — GROUP NOTE
Group Therapy Note    Date: 6/10/2020    Group Start Time: 1600  Group End Time: 36  Group Topic: Healthy Living/Wellness    Anette Rendon        Group Therapy Note    Attendees: 8         Coping Skills through playing [employer]      Status After Intervention:  Unchanged    Participation Level:  Active Listener and Interactive    Participation Quality: Appropriate      Speech:  normal      Thought Process/Content: Logical      Affective Functioning: Flat      Mood: elevated      Level of consciousness:  Alert and Oriented x4      Response to Learning: Able to retain information      Endings: None Reported    Modes of Intervention: Education and Socialization      Discipline Responsible: Registered Nurse      Signature:  Cheryl Malave RN

## 2020-06-10 NOTE — FLOWSHEET NOTE
06/10/20 1109   Status and Exam   Normal No   Facial Expression Flat   Affect Blunt   Level of Consciousness Alert   Mood:Normal No   Mood Depressed   Motor Activity:Normal No   Motor Activity Decreased   Interview Behavior Cooperative   Preception [place] to Person;[place] to Time;[place] to Place;[place] to Situation   Attention:Normal Yes   Thought Processes   (linear)   Thought Content:Normal Yes   Thought Content Other(See Comment)  (intermittent, none at this time)   Hallucinations Other (Comment)  (intermittent, none at this time)   Delusions No   Memory:Normal No   Memory Poor Recent   Insight and Judgment No   Present Suicidal Ideation No   Present Homicidal Ideation No
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
## 2020-06-11 PROCEDURE — 99239 HOSP IP/OBS DSCHRG MGMT >30: CPT | Performed by: PSYCHIATRY & NEUROLOGY

## 2020-06-11 PROCEDURE — 5130000000 HC BRIDGE APPOINTMENT

## 2020-06-11 ASSESSMENT — PAIN SCALES - GENERAL: PAINLEVEL_OUTOF10: 0
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
## 2020-06-15 NOTE — DISCHARGE SUMMARY
succinate 25 MG extended release tablet  · prazosin 1 MG capsule  · risperiDONE 1 MG tablet         Follow-up Plan: The following was given to the patient at discharge. He had a scheduled EMG appt [place]. You have indicated to us that you would be agreeable to seeking mental health services. [employer] is a provider of mental health and case management services in [place]. Open enrollment is available. Open enrollment is Monday - Thursday 8:00 AM to 4:00 PM, and Friday from 8:00 AM to 3:00 PM.  Please being a photo ID, insurance card, proof of address, and a list of current medicines. If you have no insurance, please bring income statements and proof of denial of Medicaid for sliding fee services. Name of Provider: [employer]  Provider specialty/license: Júnior Almanza  Date and time of appointment: Friday 6/12/2020 @ 9:00am <- Suggested call in time  The type/s of services requested are: Counseling/therapy  Agency name: [employer]  Address: [address], [place], [address] [place] [address]  Phone Number: [phone]  Special instructions (what to bring to appointment, etc.): Please being a photo ID, insurance card, proof of address, current medicines, and copay. **With the current concerns for Coronavirus (COVID-19), please contact your providers prior to going in to their offices. [facility] and agencies have adjusted their practices to reduce spread of the illness. If you have any questions about the virus or recommendations for home care, please call the [facility]) COVID-19 hotline at [phone]. For all emergencies, please contact 911. **    You are being provided a voucher for a one-way cab ride from your procedure at [facility]. [place]. Following your procedure you will need to contact [facility] at [phone] and they will provide this trip.  Please make sure that they receive the voucher for the ride to be covered. More than 30 minutes were spent on day-of-discharge assessment and planning with the patient.

## 2020-09-23 ENCOUNTER — APPOINTMENT (OUTPATIENT)
Dept: GENERAL RADIOLOGY | Age: 45
End: 2020-09-23
Payer: MEDICAID

## 2020-09-23 ENCOUNTER — HOSPITAL ENCOUNTER (EMERGENCY)
Age: 45
Discharge: HOME | End: 2020-09-23
Payer: MEDICAID

## 2020-09-23 VITALS
TEMPERATURE: 97.7 F | WEIGHT: 180 LBS | SYSTOLIC BLOOD PRESSURE: 188 MMHG | HEIGHT: 70 IN | HEART RATE: 76 BPM | DIASTOLIC BLOOD PRESSURE: 96 MMHG | BODY MASS INDEX: 25.77 KG/M2 | RESPIRATION RATE: 16 BRPM | OXYGEN SATURATION: 100 %

## 2020-09-23 PROCEDURE — 99284 EMERGENCY DEPT VISIT MOD MDM: CPT

## 2020-09-23 PROCEDURE — 6370000000 HC RX 637 (ALT 250 FOR IP): Performed by: PHYSICIAN ASSISTANT

## 2020-09-23 PROCEDURE — 71046 X-RAY EXAM CHEST 2 VIEWS: CPT

## 2020-09-23 RX ORDER — IBUPROFEN 400 MG/1
400 TABLET ORAL ONCE
Status: COMPLETED | OUTPATIENT
Start: 2020-09-23 | End: 2020-09-23

## 2020-09-23 RX ORDER — IBUPROFEN 400 MG/1
400 TABLET ORAL EVERY 6 HOURS PRN
Qty: 20 TABLET | Refills: 0 | Status: SHIPPED | OUTPATIENT
Start: 2020-09-23 | End: 2021-01-04

## 2020-09-23 RX ORDER — ACETAMINOPHEN 500 MG
1000 TABLET ORAL ONCE
Status: COMPLETED | OUTPATIENT
Start: 2020-09-23 | End: 2020-09-23

## 2020-09-23 RX ORDER — LIDOCAINE 4 G/G
1 PATCH TOPICAL DAILY
Qty: 30 PATCH | Refills: 0 | Status: SHIPPED | OUTPATIENT
Start: 2020-09-23 | End: 2020-10-23

## 2020-09-23 RX ORDER — ACETAMINOPHEN 500 MG
1000 TABLET ORAL EVERY 6 HOURS PRN
Qty: 40 TABLET | Refills: 0 | Status: SHIPPED | OUTPATIENT
Start: 2020-09-23 | End: 2021-01-04

## 2020-09-23 RX ADMIN — IBUPROFEN 400 MG: 400 TABLET, FILM COATED ORAL at 13:40

## 2020-09-23 RX ADMIN — ACETAMINOPHEN 1000 MG: 500 TABLET ORAL at 13:40

## 2020-09-23 ASSESSMENT — PAIN SCALES - GENERAL
PAINLEVEL_OUTOF10: 9
PAINLEVEL_OUTOF10: 9

## 2020-09-23 ASSESSMENT — PAIN DESCRIPTION - PAIN TYPE: TYPE: ACUTE PAIN

## 2020-09-23 ASSESSMENT — PAIN DESCRIPTION - ORIENTATION: ORIENTATION: LEFT

## 2020-09-23 ASSESSMENT — PAIN DESCRIPTION - LOCATION: LOCATION: RIB CAGE

## 2020-09-23 NOTE — ED NOTES
Verbal and written discharge instructions given. Incentive spirometer provided and proper use instructed to patient. Prescriptions given to patient. Patient in stable condition, discharged home.      Mike Whitten RN  09/23/20 4570
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
## 2020-11-24 PROCEDURE — 96375 TX/PRO/DX INJ NEW DRUG ADDON: CPT

## 2020-11-24 PROCEDURE — 99282 EMERGENCY DEPT VISIT SF MDM: CPT

## 2020-11-24 PROCEDURE — 96374 THER/PROPH/DIAG INJ IV PUSH: CPT

## 2020-11-25 ENCOUNTER — HOSPITAL ENCOUNTER (EMERGENCY)
Age: 45
Discharge: HOME | End: 2020-11-25
Attending: EMERGENCY MEDICINE
Payer: MEDICAID

## 2020-11-25 VITALS
SYSTOLIC BLOOD PRESSURE: 162 MMHG | DIASTOLIC BLOOD PRESSURE: 100 MMHG | WEIGHT: 200 LBS | HEIGHT: 70 IN | BODY MASS INDEX: 28.63 KG/M2 | HEART RATE: 91 BPM | OXYGEN SATURATION: 97 % | TEMPERATURE: 97.8 F | RESPIRATION RATE: 15 BRPM

## 2020-11-25 PROCEDURE — 96375 TX/PRO/DX INJ NEW DRUG ADDON: CPT

## 2020-11-25 PROCEDURE — 6360000002 HC RX W HCPCS: Performed by: EMERGENCY MEDICINE

## 2020-11-25 PROCEDURE — 96374 THER/PROPH/DIAG INJ IV PUSH: CPT

## 2020-11-25 RX ORDER — DIPHENHYDRAMINE HCL 25 MG
25 CAPSULE ORAL EVERY 4 HOURS PRN
Qty: 25 CAPSULE | Refills: 0 | Status: SHIPPED | OUTPATIENT
Start: 2020-11-25 | End: 2020-12-05

## 2020-11-25 RX ORDER — METOCLOPRAMIDE HYDROCHLORIDE 5 MG/ML
10 INJECTION INTRAMUSCULAR; INTRAVENOUS ONCE
Status: COMPLETED | OUTPATIENT
Start: 2020-11-25 | End: 2020-11-25

## 2020-11-25 RX ORDER — METOCLOPRAMIDE 10 MG/1
10 TABLET ORAL 4 TIMES DAILY
Qty: 20 TABLET | Refills: 0 | Status: SHIPPED | OUTPATIENT
Start: 2020-11-25 | End: 2021-01-04 | Stop reason: ALTCHOICE

## 2020-11-25 RX ORDER — NAPROXEN 250 MG/1
250 TABLET ORAL 2 TIMES DAILY WITH MEALS
Qty: 60 TABLET | Refills: 0 | Status: SHIPPED | OUTPATIENT
Start: 2020-11-25 | End: 2021-01-04

## 2020-11-25 RX ORDER — DIPHENHYDRAMINE HYDROCHLORIDE 50 MG/ML
12.5 INJECTION INTRAMUSCULAR; INTRAVENOUS ONCE
Status: COMPLETED | OUTPATIENT
Start: 2020-11-25 | End: 2020-11-25

## 2020-11-25 RX ADMIN — DIPHENHYDRAMINE HYDROCHLORIDE 12.5 MG: 50 INJECTION, SOLUTION INTRAMUSCULAR; INTRAVENOUS at 01:10

## 2020-11-25 RX ADMIN — METOCLOPRAMIDE 10 MG: 5 INJECTION, SOLUTION INTRAMUSCULAR; INTRAVENOUS at 01:10

## 2020-11-25 ASSESSMENT — ENCOUNTER SYMPTOMS
SHORTNESS OF BREATH: 0
RHINORRHEA: 0
PHOTOPHOBIA: 0
BACK PAIN: 0
NAUSEA: 0
WHEEZING: 0
COLOR CHANGE: 0
VOMITING: 0

## 2020-11-25 ASSESSMENT — PAIN SCALES - GENERAL: PAINLEVEL_OUTOF10: 8

## 2020-11-25 ASSESSMENT — PAIN DESCRIPTION - ORIENTATION: ORIENTATION: LEFT

## 2020-11-25 ASSESSMENT — PAIN DESCRIPTION - PAIN TYPE: TYPE: ACUTE PAIN

## 2020-11-25 ASSESSMENT — PAIN DESCRIPTION - LOCATION: LOCATION: HEAD

## 2020-11-25 NOTE — ED NOTES
Pt ok to d/c to home. Pt refused d/c VS. Pt given d/c instructions. Pt verbalized understating including Rx and follow up care. Pt ambulated to lobby for ride home.  0 s/s of distress at time of d/c.        Bibiana Herrmann, RN  11/25/20 0875

## 2020-11-25 NOTE — ED PROVIDER NOTES
Comment: not asked   Lifestyle    Physical activity     Days per week: None     Minutes per session: None    Stress: None   Relationships    Social connections     Talks on phone: None     Gets together: None     Attends Mandaen service: None     Active member of club or organization: None     Attends meetings of clubs or organizations: None     Relationship status: None    Intimate partner violence     Fear of current or ex partner: None     Emotionally abused: None     Physically abused: None     Forced sexual activity: None   Other Topics Concern    None   Social History Narrative    None       SCREENINGS             PHYSICAL EXAM    (up to 7 for level 4, 8 or more for level 5)     ED Triage Vitals   BP Temp Temp src Pulse Resp SpO2 Height Weight   11/25/20 0017 11/25/20 0015 -- 11/25/20 0001 11/25/20 0015 11/25/20 0001 11/25/20 0015 11/25/20 0015   (!) 162/100 97.8 °F (36.6 °C)  103 15 97 % 5' 10\" (1.778 m) 200 lb (90.7 kg)       Physical Exam  Vitals signs and nursing note reviewed. Constitutional:       General: He is not in acute distress. Appearance: He is well-developed. HENT:      Head: Normocephalic and atraumatic. Eyes:      Extraocular Movements: Extraocular movements intact. Conjunctiva/sclera: Conjunctivae normal.      Pupils: Pupils are equal, round, and reactive to light. Neck:      Musculoskeletal: Normal range of motion. No neck rigidity or muscular tenderness. Trachea: No tracheal deviation. Cardiovascular:      Rate and Rhythm: Normal rate and regular rhythm. Pulmonary:      Effort: Pulmonary effort is normal.      Breath sounds: Normal breath sounds. No wheezing or rales. Abdominal:      General: There is no distension. Palpations: Abdomen is soft. Tenderness: There is no abdominal tenderness. Musculoskeletal: Normal range of motion. General: No deformity. Skin:     General: Skin is warm and dry.       Capillary Refill: Capillary refill takes less than 2 seconds. Neurological:      General: No focal deficit present. Mental Status: He is alert and oriented to person, place, and time. Mental status is at baseline. Cranial Nerves: No cranial nerve deficit. Sensory: No sensory deficit. Motor: No weakness. Coordination: Coordination normal.      Gait: Gait normal.         RESULTS     EKG: All EKG's are interpreted by the Emergency Department Physician who either signs or Co-signsthis chart in the absence of a cardiologist.      RADIOLOGY:   Zita Barrera such as CT, Ultrasound and MRI are read by the radiologist. Plain radiographic images are visualized and preliminarily interpreted by the emergency physician with the below findings:      Interpretation per the Radiologist below, if available at the time ofthis note:    No orders to display         ED BEDSIDE ULTRASOUND:   Performed by ED Physician - none    LABS:  Labs Reviewed - No data to display    All other labs were within normal range or not returned as of this dictation. EMERGENCY DEPARTMENT COURSE and DIFFERENTIAL DIAGNOSIS/MDM:   Vitals:    Vitals:    11/25/20 0001 11/25/20 0015 11/25/20 0017   BP:   (!) 162/100   Pulse: 103 91    Resp:  15    Temp:  97.8 °F (36.6 °C)    SpO2: 97% 97%    Weight:  200 lb (90.7 kg)    Height:  5' 10\" (1.778 m)        Patient was given thefollowing medications:  Medications   metoclopramide (REGLAN) injection 10 mg (has no administration in time range)   diphenhydrAMINE (BENADRYL) injection 12.5 mg (has no administration in time range)       ED COURSE & MEDICAL DECISION MAKING    Pertinent Labs & Imaging studies reviewed. (See chart for details)   -  Patient seen and evaluated in the emergency department. -  Triage and nursing notes reviewed and incorporated. -  Old chart records reviewed and incorporated.   -  Differential diagnosis includes: Differential Diagnosis: Subarachnoid hemorrhage, Meningitis, Temporal arteritis, Pseudotumor Cerebri, Migraine, other    -  Work-up included:  See above  -  ED treatment included: See above  -  Results discussed with patient. Patient presents for a headache that is similar to previous migraines in the past.  Is of gradual onset. Did not occur with exertion. Denies infectious symptoms. On exam cranial nerves II 12 are grossly intact. He has no meningeal signs. Strength and sensation intact in all extremities. I have low suspicion for CVA meningitis or encephalitis. Patient feels improved on reevaluation. Symptomatic treatment with expectant management discussed with the patient and the amenable to treatment plan and outpatient follow-up. Strict return precautions were discussed with the patient. They demonstrated understanding of when to return to the emergency department for new or worsening symptoms. .  The patient is agreeable with plan of care and disposition. REASSESSMENT          CRITICAL CARE TIME   Total Critical Care time was 10 minutes, excluding separatelyreportable procedures. There was a high probability ofclinically significant/life threatening deterioration in the patient's condition which required my urgent intervention. CONSULTS:  None    PROCEDURES:  Unless otherwise noted below, none     Procedures    FINAL IMPRESSION      1. Nonintractable episodic headache, unspecified headache type          DISPOSITION/PLAN   DISPOSITION        PATIENT REFERREDTO:  No follow-up provider specified.     DISCHARGEMEDICATIONS:  New Prescriptions    No medications on file          (Please note that portions of this note were completed with a voice recognition program.  Efforts were made to edit the dictations but occasionally words are mis-transcribed.)    Evans Jeffers MD (electronically signed)  Attending Emergency Physician          Evans Jeffers MD  11/25/20 4802

## 2020-11-25 NOTE — ED NOTES
Pt ambulated to room, w/o distress. Pt c/o migraine X 3 days. Pt sitting on cot in position of comfort. Pt texting on phone and requesting call light to turn on TV. IV access in place. Awaiting for MD evaluation. Will continue to monitor.      Fahad Brown RN  11/25/20 8486

## 2021-01-04 ENCOUNTER — VIRTUAL VISIT (OUTPATIENT)
Dept: FAMILY MEDICINE CLINIC | Age: 46
End: 2021-01-04
Payer: COMMERCIAL

## 2021-01-04 DIAGNOSIS — R05.9 COUGH: Primary | ICD-10-CM

## 2021-01-04 PROCEDURE — G8427 DOCREV CUR MEDS BY ELIG CLIN: HCPCS | Performed by: FAMILY MEDICINE

## 2021-01-04 PROCEDURE — 99203 OFFICE O/P NEW LOW 30 MIN: CPT | Performed by: FAMILY MEDICINE

## 2021-01-04 PROCEDURE — 4004F PT TOBACCO SCREEN RCVD TLK: CPT | Performed by: FAMILY MEDICINE

## 2021-01-04 PROCEDURE — G8484 FLU IMMUNIZE NO ADMIN: HCPCS | Performed by: FAMILY MEDICINE

## 2021-01-04 PROCEDURE — G8419 CALC BMI OUT NRM PARAM NOF/U: HCPCS | Performed by: FAMILY MEDICINE

## 2021-01-04 ASSESSMENT — ENCOUNTER SYMPTOMS
SORE THROAT: 0
COUGH: 1
SHORTNESS OF BREATH: 0
RHINORRHEA: 0
HEMOPTYSIS: 0
WHEEZING: 0
HEARTBURN: 0

## 2021-01-04 ASSESSMENT — PATIENT HEALTH QUESTIONNAIRE - PHQ9: SUM OF ALL RESPONSES TO PHQ QUESTIONS 1-9: 0

## 2021-01-04 NOTE — PROGRESS NOTES
Subjective:      Patient ID: Saira Morris is a 54 y.o. male. To become established    Saira Morris is a 54 y.o. male evaluated via telephone on 1/4/2021. Consent:  He and/or health care decision maker is aware that that he may receive a bill for this telephone service, depending on his insurance coverage, and has provided verbal consent to proceed: Yes      Documentation:  I communicated with the patient and/or health care decision maker about cc. Details of this discussion including any medical advice provided: yes        I affirm this is a Patient Initiated Episode with a Patient who has not had a related appointment within my department in the past 7 days or scheduled within the next 24 hours. Patient identification was verified at the start of the visit: Yes    Total Time: minutes: 11-20 minutes    Note: not billable if this call serves to triage the patient into an appointment for the relevant concern      Myla Walton       Cough  This is a new problem. The current episode started in the past 7 days. The problem has been gradually improving. The problem occurs every few minutes. The cough is productive of sputum. Pertinent negatives include no chest pain, chills, ear congestion, ear pain, fever, headaches, heartburn, hemoptysis, myalgias, nasal congestion, postnasal drip, rash, rhinorrhea, sore throat, shortness of breath, sweats, weight loss or wheezing. Nothing aggravates the symptoms. Risk factors for lung disease include smoking/tobacco exposure. He has tried nothing for the symptoms. tobacco abuse       Review of Systems   Constitutional: Negative for chills, fever and weight loss. HENT: Negative for ear pain, postnasal drip, rhinorrhea and sore throat. Respiratory: Positive for cough. Negative for hemoptysis, shortness of breath and wheezing. Cardiovascular: Negative for chest pain. Gastrointestinal: Negative for heartburn. Musculoskeletal: Negative for myalgias.    Skin: Negative for rash. Neurological: Negative for headaches. is allergic to topamax [topiramate]; vicodin [hydrocodone-acetaminophen]; and moxifloxacin. No current outpatient medications on file. has a past medical history of Chronic back pain, History of substance abuse ([address] Hypertension, Pneumonia, and Tendonitis. Past Surgical History:   Procedure Laterality Date    EYE SURGERY      \"lazy eye\"    FRACTURE SURGERY Left 12/12/2017    orif- Bosch's fracture    NASAL SEPTUM SURGERY      NOSE SURGERY  2014    OTHER SURGICAL HISTORY Left 2017    thumb, fracture ,        reports that he has been smoking cigarettes. He has a 10.00 pack-year smoking history. He has never used smokeless tobacco. He reports previous drug use. Drugs: Marijuana and Methamphetamines. He reports that he does not drink alcohol.    family history includes Heart Disease in his mother; Heart Failure in his mother. Objective: Wt 200     Physical Exam  HENT:      Nose: No congestion. Pulmonary:      Effort: No respiratory distress. Neurological:      Mental Status: He is alert and oriented to person, place, and time. Psychiatric:         Mood and Affect: Mood normal.         Thought Content: Thought content normal.         Assessment:      cough      Plan:      Possible viral illness, improving  mucinex prn   Smoking cessation was encouraged. Cessation techniques reviewed today.   Not ready to quit    Has apt for covid-19 test tomorrow am.  Go to ED if severe sob or worsening sx    Fu for cpe next month           Barby Bernardo MD

## 2021-01-05 ENCOUNTER — TELEPHONE (OUTPATIENT)
Dept: FAMILY MEDICINE CLINIC | Age: 46
End: 2021-01-05

## 2021-01-05 ENCOUNTER — OFFICE VISIT (OUTPATIENT)
Dept: PRIMARY CARE CLINIC | Age: 46
End: 2021-01-05
Payer: COMMERCIAL

## 2021-01-05 DIAGNOSIS — Z11.59 SCREENING FOR VIRAL DISEASE: Primary | ICD-10-CM

## 2021-01-05 PROCEDURE — G8428 CUR MEDS NOT DOCUMENT: HCPCS | Performed by: NURSE PRACTITIONER

## 2021-01-05 PROCEDURE — G8419 CALC BMI OUT NRM PARAM NOF/U: HCPCS | Performed by: NURSE PRACTITIONER

## 2021-01-05 PROCEDURE — 99211 OFF/OP EST MAY X REQ PHY/QHP: CPT | Performed by: NURSE PRACTITIONER

## 2021-01-05 NOTE — PATIENT INSTRUCTIONS

## 2021-01-05 NOTE — TELEPHONE ENCOUNTER
----- Message from Karon Albert sent at 1/5/2021 12:47 PM EST -----  Subject: Message to Provider    QUESTIONS  Information for Provider? Patient would like a DRs note faxed to his work   for his appointment that he had on 1/4 with Dr. Jozef Ardon [phone]  ---------------------------------------------------------------------------  --------------  Anthony CASTELLANOS  What is the best way for the office to contact you? OK to leave message on   voicemail  Preferred Call Back Phone Number? [phone]  ---------------------------------------------------------------------------  --------------  SCRIPT ANSWERS  Relationship to Patient?  Self

## 2021-01-06 LAB — SARS-COV-2, NAA: NOT DETECTED

## 2021-02-02 ENCOUNTER — HOSPITAL ENCOUNTER (EMERGENCY)
Age: 46
Discharge: HOME | End: 2021-02-02
Payer: COMMERCIAL

## 2021-02-02 VITALS
WEIGHT: 200 LBS | HEIGHT: 70 IN | BODY MASS INDEX: 28.63 KG/M2 | RESPIRATION RATE: 20 BRPM | OXYGEN SATURATION: 100 % | HEART RATE: 88 BPM | SYSTOLIC BLOOD PRESSURE: 169 MMHG | DIASTOLIC BLOOD PRESSURE: 116 MMHG | TEMPERATURE: 97.9 F

## 2021-02-02 DIAGNOSIS — F43.23 SITUATIONAL MIXED ANXIETY AND DEPRESSIVE DISORDER: Primary | ICD-10-CM

## 2021-02-02 DIAGNOSIS — Z86.59 HISTORY OF ANXIETY: ICD-10-CM

## 2021-02-02 DIAGNOSIS — F43.10 PTSD (POST-TRAUMATIC STRESS DISORDER): ICD-10-CM

## 2021-02-02 DIAGNOSIS — Z86.59 HISTORY OF MAJOR DEPRESSION: ICD-10-CM

## 2021-02-02 PROCEDURE — 99283 EMERGENCY DEPT VISIT LOW MDM: CPT

## 2021-02-02 NOTE — ED NOTES
Pt instructed to follow up with PCP. Assessed per Cookie TALAVERA.      Danae Hoover, KYLE  47/00/53 8665

## 2021-02-02 NOTE — ED NOTES
Anxiety. Pt states \"I have PTSD I feel anxious all over I just need a med to help me sleep or something to calm me down/I do have a headache right now it's not as bad as earlier/no chest or back pain at this time\".      Kamilah Montes, MARY CARMENN  43/52/14 4599

## 2021-02-02 NOTE — ED PROVIDER NOTES
[facility]  ED  EMERGENCY DEPARTMENT ENCOUNTER        Pt Name: Trevor Holt  MRN: [id number]  [place] 1975  Date of evaluation: 2/2/2021  Provider: Viri Fermin PA-C  PCP: No primary care provider on file. WERNER. I have evaluated this patient. My supervising physician was available for consultation. Tatyana Pastrana      CHIEF COMPLAINT       Chief Complaint   Patient presents with    Anxiety     pt states \"I'm just anxious unable to sleep/need something/meds to help me/I do not wanrt to hurt my self or others\"       HISTORY OF PRESENT ILLNESS   (Location, Timing/Onset, Context/Setting, Quality, Duration, Modifying Factors, Severity, Associated Signs and Symptoms)  Note limiting factors. Trevor Holt is a 54 y.o. male patient presenting wanting medical information. Patient states needs medication for PTSD, anxiety, depression. I did discuss with the patient declined medication. Patient wanting document indicating his diagnosis of depression, anxiety and PTSD. He is not suicidal or homicidal.    Nursing Notes were all reviewed and agreed with or any disagreements were addressed in the HPI. REVIEW OF SYSTEMS    (2-9 systems for level 4, 10 or more for level 5)     Review of Systems    Positives and Pertinent negatives as per HPI. Except as noted above in the ROS, all other systems were reviewed and negative.        PAST MEDICAL HISTORY     Past Medical History:   Diagnosis Date    Chronic back pain     History of substance abuse ([address]     meth, opiates, sober since 2019    Hypertension     Migraine     Osteomyelitis of left foot ([address]     2019    Pneumonia     PTSD (post-traumatic stress disorder)     per psychiatry history    Suicidal ideation 06/2020    history of    Tendonitis     left wrist    Toe fracture, right     2019         SURGICAL HISTORY     Past Surgical History:   Procedure Laterality Date    EYE SURGERY      \"lazy eye\"    FRACTURE SURGERY Mood and Affect: Mood normal.         Behavior: Behavior normal.         Thought Content: Thought content normal.         Judgment: Judgment normal.         DIAGNOSTIC RESULTS   LABS:    Labs Reviewed - No data to display    All other labs were within normal range or not returned as of this dictation. EKG: All EKG's are interpreted by the Emergency Department Physician in the absence of a cardiologist.  Please see their note for interpretation of EKG. RADIOLOGY:   Non-plain film images such as CT, Ultrasound and MRI are read by the radiologist. Plain radiographic images are visualized and preliminarily interpreted by the ED Provider with the below findings:        Interpretation per the Radiologist below, if available at the time of this note:    No orders to display     No results found. PROCEDURES   Unless otherwise noted below, none     Procedures    CRITICAL CARE TIME   N/A    CONSULTS:  None      EMERGENCY DEPARTMENT COURSE and DIFFERENTIAL DIAGNOSIS/MDM:   Vitals:    Vitals:    02/02/21 1444 02/02/21 1511   BP:  (!) 169/116   Pulse:  88   Resp:  20   Temp:  97.9 °F (36.6 °C)   TempSrc:  Oral   SpO2:  100%   Weight: 200 lb (90.7 kg)    Height: 5' 10\" (1.778 m)        Patient was given the following medications:  Medications - No data to display      The patient declining prescriptions. I offered hydroxyzine. He had requested Ativan. I declined. Patient requesting his medical history information. 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 The patient will be discharged to follow-up with psychiatry. He was needing his past medical history and this was provided within his discharge summary. FINAL IMPRESSION      1. Situational mixed anxiety and depressive disorder    2.  PTSD (post-traumatic stress disorder) 3. History of anxiety    4. History of major depression          DISPOSITION/PLAN   DISPOSITION Decision To Discharge 02/02/2021 03:02:56 PM      PATIENT REFERREDTO:  Your psychiatrist    Schedule an appointment as soon as possible for a visit in 3 days      [facility]  ED  [address]  Go to   If symptoms worsen      DISCHARGE MEDICATIONS:  There are no discharge medications for this patient. DISCONTINUED MEDICATIONS:  There are no discharge medications for this patient. (Please note that portions of this note were completed with a voice recognition program.  Efforts were made to edit the dictations but occasionally words are mis-transcribed. )    Tello Araujo PA-C (electronically signed)           Tello Araujo PA-C  02/02/21 1188

## 2021-02-02 NOTE — ED NOTES
Pt was very anxious/nervous upon arrival to ED. Pt very short with answers to questions. Did not repeat Vitals. Pt ready to go. I ask pt again per Osito TALAVERA request if he wanted something to help with anxiety to go home with. Pt states \"NO this is all I want\". Pt given D/C papers with Diagnosis of past medical HX.       Teresita Parmar LPN  47/75/68 0062

## 2021-04-19 ENCOUNTER — HOSPITAL ENCOUNTER (EMERGENCY)
Age: 46
Discharge: HOME | End: 2021-04-20
Payer: MEDICAID

## 2021-04-19 DIAGNOSIS — M19.032 ARTHRITIS OF LEFT WRIST: ICD-10-CM

## 2021-04-19 DIAGNOSIS — M25.532 LEFT WRIST PAIN: Primary | ICD-10-CM

## 2021-04-19 PROCEDURE — 99283 EMERGENCY DEPT VISIT LOW MDM: CPT

## 2021-04-20 ENCOUNTER — APPOINTMENT (OUTPATIENT)
Dept: GENERAL RADIOLOGY | Age: 46
End: 2021-04-20
Payer: MEDICAID

## 2021-04-20 VITALS
RESPIRATION RATE: 16 BRPM | SYSTOLIC BLOOD PRESSURE: 151 MMHG | TEMPERATURE: 98 F | OXYGEN SATURATION: 96 % | HEART RATE: 70 BPM | DIASTOLIC BLOOD PRESSURE: 87 MMHG

## 2021-04-20 PROCEDURE — 73110 X-RAY EXAM OF WRIST: CPT

## 2021-04-20 RX ORDER — NAPROXEN 500 MG/1
500 TABLET ORAL 2 TIMES DAILY WITH MEALS
Qty: 30 TABLET | Refills: 0 | Status: SHIPPED | OUTPATIENT
Start: 2021-04-20 | End: 2022-01-28 | Stop reason: ALTCHOICE

## 2021-04-20 ASSESSMENT — ENCOUNTER SYMPTOMS
VOMITING: 0
SORE THROAT: 0
SHORTNESS OF BREATH: 0
WHEEZING: 0
ABDOMINAL PAIN: 0
COUGH: 0
NAUSEA: 0
BACK PAIN: 0
DIARRHEA: 0
COLOR CHANGE: 0

## 2021-04-20 NOTE — ED NOTES
Ace Wrapped completed, with L wrist brace placed. PT tolerated Well, RN given two stickers.       John Marion  04/20/21 0114

## 2021-04-20 NOTE — ED PROVIDER NOTES
**ADVANCED PRACTICE PROVIDER, I HAVE EVALUATED THIS [facility]  ED  EMERGENCY DEPARTMENT ENCOUNTER      Pt Name: Сергйе Ray  RFM:[id number]  [place] 1975  Date of evaluation: 4/19/2021  Provider: KEN Bustamante CNP      Chief Complaint:    Chief Complaint   Patient presents with    Wrist Pain     had 2 surgeries wrist started hurting three weeks ago, left wrist       Nursing Notes, Past Medical Hx, Past Surgical Hx, Social Hx, Allergies, and Family Hx were all reviewed and agreed with or any disagreements were addressed in the HPI.    HPI:  (Location, Duration, Timing, Severity, Quality, Assoc Sx, Context, Modifying factors)  This is a  51 y.o. male who presents emergency department left wrist pain, patient states that he has had surgery on the left thenar area along with the left ulna, states surgery was several years ago. He states over the past 3 weeks has had worsening pain in these areas. Denies any new trauma or injury. He states that he is having more discomfort with movement. Has not taken anything for the pain, rates the pain a 7 out of 10. He denies any numbness tingling or paresthesias. No injuries or additional complaints, no additional aggravating or alleviating factors. The patient presents awake, alert and in no acute distress or toxic appearance.     PastMedical/Surgical History:      Diagnosis Date    Chronic back pain     History of substance abuse ([address]     meth, opiates, sober since 2019    Hypertension     Migraine     Osteomyelitis of left foot ([address]     2019    Pneumonia     PTSD (post-traumatic stress disorder)     per psychiatry history    Suicidal ideation 06/2020    history of    Tendonitis     left wrist    Toe fracture, right     2019         Procedure Laterality Date    EYE SURGERY      \"lazy eye\"    FRACTURE SURGERY Left 12/12/2017    orif- Bosch's fracture    NASAL SEPTUM SURGERY      NOSE SURGERY  2014    OTHER SURGICAL HISTORY Left 2017    thumb, fracture ,       Medications:  Previous Medications    No medications on file         Review of Systems:  Review of Systems   Constitutional: Negative for chills and fever. HENT: Negative for congestion and sore throat. Respiratory: Negative for cough, shortness of breath and wheezing. Cardiovascular: Negative for chest pain. Gastrointestinal: Negative for abdominal pain, diarrhea, nausea and vomiting. Genitourinary: Negative for difficulty urinating, dysuria and frequency. Musculoskeletal: Positive for arthralgias and myalgias. Negative for back pain and joint swelling. Patient complains left wrist pain, patient states that he has had surgery on the left thenar area along with the left ulna, states surgery was several years ago. He states over the past 3 weeks has had worsening pain in these areas. Denies any new trauma or injury. Skin: Negative for color change. Neurological: Negative for weakness, numbness and headaches. Positives and Pertinent negatives as per HPI. Except as noted above in the ROS, problem specific ROS was completed and is negative. Physical Exam:  Physical Exam  Vitals signs and nursing note reviewed. Constitutional:       Appearance: He is well-developed. He is not diaphoretic. HENT:      Head: Normocephalic. Right Ear: External ear normal.      Left Ear: External ear normal.   Eyes:      General: No scleral icterus. Right eye: No discharge. Left eye: No discharge. Neck:      Musculoskeletal: Normal range of motion and neck supple. Cardiovascular:      Rate and Rhythm: Normal rate. Pulmonary:      Effort: Pulmonary effort is normal. No respiratory distress. Musculoskeletal: Normal range of motion. General: Tenderness present.       Comments: Patient has reproducible tenderness of the distal radius and ulnar side of the left wrist, no surrounding erythema edema, normal flexion extension. No numbness tingling or paresthesias. Compartments are soft. Peripheral pulses are 2+, cap refill less than 3 seconds. Negative Tinel's sign. Skin:     General: Skin is warm. Capillary Refill: Capillary refill takes less than 2 seconds. Coloration: Skin is not pale. Neurological:      Mental Status: He is alert and oriented to person, place, and time. GCS: GCS eye subscore is 4. GCS verbal subscore is 5. GCS motor subscore is 6. Psychiatric:         Behavior: Behavior normal.         MEDICAL DECISION MAKING    Vitals:    Vitals:    04/19/21 2352 04/19/21 2353   BP:  (!) 171/99   Pulse: 71    Resp: 16    Temp: 98 °F (36.7 °C)    TempSrc: Oral    SpO2: 95%        LABS:Labs Reviewed - No data to display     Remainder of labs reviewed and werenegative at this time or not returned at the time of this note. RADIOLOGY:   Non-plain film images such as CT, Ultrasound and MRI are read by the radiologist. Gayatri GRIMM APRN - CNP have directly visualized the radiologic plain film image(s) with the below findings:        Interpretation per the Radiologist below, if available at the time of this note:    XR WRIST LEFT (MIN 3 VIEWS)   Final Result   Old unfused fracture along the styloid process of the ulna which is unchanged. Mild osteoarthritic changes along the radiocarpal joint which is unchanged   with no acute bony abnormality. Postop changes and old trauma along the base of the 1st metacarpal bone which   is more apparent. MEDICAL DECISION MAKING / ED COURSE:      PROCEDURES:   Procedures    None    Patient was given:  Medications - No data to display    Patient presents emergency department left wrist pain, patient states that he has had surgery on the left thenar area along with the left ulna, states surgery was several years ago. He states over the past 3 weeks has had worsening pain in these areas. Denies any new trauma or injury.      After evaluation medications on file              (Please note the MDM and HPI sections of this note were completed with a voice recognition program.  Efforts were made to edit the dictations but occasionally words are mis-transcribed.)    Electronically signed, KEN Jennings CNP,          KEN Jennings CNP  04/20/21 0105

## 2021-04-27 ENCOUNTER — HOSPITAL ENCOUNTER (EMERGENCY)
Age: 46
Discharge: HOME | End: 2021-04-27
Payer: MEDICAID

## 2021-04-27 VITALS
DIASTOLIC BLOOD PRESSURE: 92 MMHG | SYSTOLIC BLOOD PRESSURE: 166 MMHG | WEIGHT: 200 LBS | OXYGEN SATURATION: 97 % | TEMPERATURE: 98 F | RESPIRATION RATE: 17 BRPM | BODY MASS INDEX: 28.63 KG/M2 | HEART RATE: 99 BPM | HEIGHT: 70 IN

## 2021-04-27 DIAGNOSIS — F41.0 ANXIETY ATTACK: Primary | ICD-10-CM

## 2021-04-27 DIAGNOSIS — Z86.59 HISTORY OF POST TRAUMATIC STRESS DISORDER: ICD-10-CM

## 2021-04-27 PROCEDURE — 6360000002 HC RX W HCPCS: Performed by: NURSE PRACTITIONER

## 2021-04-27 PROCEDURE — 96372 THER/PROPH/DIAG INJ SC/IM: CPT

## 2021-04-27 PROCEDURE — 99284 EMERGENCY DEPT VISIT MOD MDM: CPT

## 2021-04-27 RX ORDER — LORAZEPAM 2 MG/ML
1 INJECTION INTRAMUSCULAR ONCE
Status: COMPLETED | OUTPATIENT
Start: 2021-04-27 | End: 2021-04-27

## 2021-04-27 RX ORDER — HYDROXYZINE HYDROCHLORIDE 25 MG/1
25 TABLET, FILM COATED ORAL EVERY 8 HOURS PRN
Qty: 20 TABLET | Refills: 0 | Status: SHIPPED | OUTPATIENT
Start: 2021-04-27 | End: 2021-05-07

## 2021-04-27 RX ADMIN — LORAZEPAM 1 MG: 2 INJECTION, SOLUTION INTRAMUSCULAR; INTRAVENOUS at 16:09

## 2021-04-27 ASSESSMENT — ENCOUNTER SYMPTOMS
VOMITING: 0
BACK PAIN: 0
COUGH: 0
SHORTNESS OF BREATH: 0
COLOR CHANGE: 0
ABDOMINAL PAIN: 0
DIARRHEA: 0
NAUSEA: 0

## 2021-04-27 ASSESSMENT — PAIN DESCRIPTION - LOCATION: LOCATION: WRIST

## 2021-04-27 ASSESSMENT — PAIN DESCRIPTION - PAIN TYPE: TYPE: CHRONIC PAIN

## 2021-04-27 ASSESSMENT — PAIN DESCRIPTION - DESCRIPTORS: DESCRIPTORS: SHARP

## 2021-04-27 ASSESSMENT — PAIN DESCRIPTION - FREQUENCY: FREQUENCY: CONTINUOUS

## 2021-04-27 ASSESSMENT — PAIN SCALES - GENERAL: PAINLEVEL_OUTOF10: 8
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
## 2021-04-27 NOTE — ED NOTES
--Patient provided with discharge instructions, 1 prescription. --Instructions, and follow-up appointments reviewed with patient/family. No further questions or needs at this time. --Vital signs and patient stable upon discharge. --Patient ambulatory to [facility].         Gretel Donovan RN  04/27/21 7527

## 2021-07-16 ENCOUNTER — HOSPITAL ENCOUNTER (EMERGENCY)
Age: 46
Discharge: HOME | End: 2021-07-16
Payer: MEDICAID

## 2021-07-16 VITALS
WEIGHT: 200 LBS | RESPIRATION RATE: 16 BRPM | BODY MASS INDEX: 28 KG/M2 | OXYGEN SATURATION: 99 % | HEART RATE: 70 BPM | DIASTOLIC BLOOD PRESSURE: 102 MMHG | HEIGHT: 71 IN | SYSTOLIC BLOOD PRESSURE: 152 MMHG | TEMPERATURE: 97.8 F

## 2021-07-16 DIAGNOSIS — G89.29 CHRONIC NONINTRACTABLE HEADACHE, UNSPECIFIED HEADACHE TYPE: Primary | ICD-10-CM

## 2021-07-16 DIAGNOSIS — R51.9 CHRONIC NONINTRACTABLE HEADACHE, UNSPECIFIED HEADACHE TYPE: Primary | ICD-10-CM

## 2021-07-16 LAB
A/G RATIO: 1.7 (ref 1.1–2.2)
ALBUMIN SERPL-MCNC: 4.2 G/DL (ref 3.4–5)
ALP BLD-CCNC: 85 U/L (ref 40–129)
ALT SERPL-CCNC: 16 U/L (ref 10–40)
ANION GAP SERPL CALCULATED.3IONS-SCNC: 8 MMOL/L (ref 3–16)
AST SERPL-CCNC: 16 U/L (ref 15–37)
BASOPHILS ABSOLUTE: 0.1 K/UL (ref 0–0.2)
BASOPHILS RELATIVE PERCENT: 0.7 %
BILIRUB SERPL-MCNC: 0.4 MG/DL (ref 0–1)
BUN BLDV-MCNC: 11 MG/DL (ref 7–20)
CALCIUM SERPL-MCNC: 9.4 MG/DL (ref 8.3–10.6)
CHLORIDE BLD-SCNC: 105 MMOL/L (ref 99–110)
CO2: 25 MMOL/L (ref 21–32)
CREAT SERPL-MCNC: 0.8 MG/DL (ref 0.9–1.3)
EOSINOPHILS ABSOLUTE: 0.1 K/UL (ref 0–0.6)
EOSINOPHILS RELATIVE PERCENT: 0.9 %
GFR AFRICAN AMERICAN: >60
GFR NON-AFRICAN AMERICAN: >60
GLOBULIN: 2.5 G/DL
GLUCOSE BLD-MCNC: 101 MG/DL (ref 70–99)
HCT VFR BLD CALC: 48.4 % (ref 40.5–52.5)
HEMOGLOBIN: 17.2 G/DL (ref 13.5–17.5)
LYMPHOCYTES ABSOLUTE: 2.2 K/UL (ref 1–5.1)
LYMPHOCYTES RELATIVE PERCENT: 22 %
MCH RBC QN AUTO: 33.5 PG (ref 26–34)
MCHC RBC AUTO-ENTMCNC: 35.5 G/DL (ref 31–36)
MCV RBC AUTO: 94.3 FL (ref 80–100)
MONOCYTES ABSOLUTE: 0.5 K/UL (ref 0–1.3)
MONOCYTES RELATIVE PERCENT: 4.7 %
NEUTROPHILS ABSOLUTE: 7.1 K/UL (ref 1.7–7.7)
NEUTROPHILS RELATIVE PERCENT: 71.7 %
PDW BLD-RTO: 13.2 % (ref 12.4–15.4)
PLATELET # BLD: 228 K/UL (ref 135–450)
PMV BLD AUTO: 9 FL (ref 5–10.5)
POTASSIUM SERPL-SCNC: 3.9 MMOL/L (ref 3.5–5.1)
RBC # BLD: 5.13 M/UL (ref 4.2–5.9)
SODIUM BLD-SCNC: 138 MMOL/L (ref 136–145)
TOTAL PROTEIN: 6.7 G/DL (ref 6.4–8.2)
WBC # BLD: 9.9 K/UL (ref 4–11)

## 2021-07-16 PROCEDURE — 99283 EMERGENCY DEPT VISIT LOW MDM: CPT

## 2021-07-16 PROCEDURE — 96374 THER/PROPH/DIAG INJ IV PUSH: CPT

## 2021-07-16 PROCEDURE — 96375 TX/PRO/DX INJ NEW DRUG ADDON: CPT

## 2021-07-16 PROCEDURE — 80053 COMPREHEN METABOLIC PANEL: CPT

## 2021-07-16 PROCEDURE — 2580000003 HC RX 258: Performed by: NURSE PRACTITIONER

## 2021-07-16 PROCEDURE — 6360000002 HC RX W HCPCS: Performed by: NURSE PRACTITIONER

## 2021-07-16 PROCEDURE — 85025 COMPLETE CBC W/AUTO DIFF WBC: CPT

## 2021-07-16 RX ORDER — KETOROLAC TROMETHAMINE 30 MG/ML
30 INJECTION, SOLUTION INTRAMUSCULAR; INTRAVENOUS ONCE
Status: COMPLETED | OUTPATIENT
Start: 2021-07-16 | End: 2021-07-16

## 2021-07-16 RX ORDER — DIPHENHYDRAMINE HYDROCHLORIDE 50 MG/ML
25 INJECTION INTRAMUSCULAR; INTRAVENOUS ONCE
Status: COMPLETED | OUTPATIENT
Start: 2021-07-16 | End: 2021-07-16

## 2021-07-16 RX ORDER — 0.9 % SODIUM CHLORIDE 0.9 %
1000 INTRAVENOUS SOLUTION INTRAVENOUS ONCE
Status: COMPLETED | OUTPATIENT
Start: 2021-07-16 | End: 2021-07-16

## 2021-07-16 RX ORDER — METOCLOPRAMIDE HYDROCHLORIDE 5 MG/ML
10 INJECTION INTRAMUSCULAR; INTRAVENOUS ONCE
Status: COMPLETED | OUTPATIENT
Start: 2021-07-16 | End: 2021-07-16

## 2021-07-16 RX ADMIN — METOCLOPRAMIDE HYDROCHLORIDE 10 MG: 5 INJECTION INTRAMUSCULAR; INTRAVENOUS at 13:27

## 2021-07-16 RX ADMIN — SODIUM CHLORIDE 1000 ML: 9 INJECTION, SOLUTION INTRAVENOUS at 13:25

## 2021-07-16 RX ADMIN — KETOROLAC TROMETHAMINE 30 MG: 30 INJECTION, SOLUTION INTRAMUSCULAR; INTRAVENOUS at 13:27

## 2021-07-16 RX ADMIN — DIPHENHYDRAMINE HYDROCHLORIDE 25 MG: 50 INJECTION, SOLUTION INTRAMUSCULAR; INTRAVENOUS at 13:26

## 2021-07-16 ASSESSMENT — PAIN DESCRIPTION - LOCATION: LOCATION: HEAD;ABDOMEN

## 2021-07-16 ASSESSMENT — PAIN DESCRIPTION - ORIENTATION: ORIENTATION: LEFT

## 2021-07-16 ASSESSMENT — PAIN DESCRIPTION - FREQUENCY: FREQUENCY: CONTINUOUS

## 2021-07-16 ASSESSMENT — PAIN DESCRIPTION - PAIN TYPE: TYPE: CHRONIC PAIN

## 2021-07-16 ASSESSMENT — PAIN DESCRIPTION - DESCRIPTORS: DESCRIPTORS: TIGHTNESS;ACHING

## 2021-07-16 ASSESSMENT — PAIN SCALES - GENERAL: PAINLEVEL_OUTOF10: 8

## 2021-07-16 NOTE — ED NOTES
Patient walked out of room and out of department and decided not to wait for dc paperwork or to speak with provider again. Ellen Deleon NP updated on patient leaving department.      Sean Figueroa RN  07/16/21 1345

## 2021-07-16 NOTE — ED NOTES
Patient stated that \"he you can't stop the ringing in my ears\" that he states has been going on for 1 year, that he would like his fluids stopped, IV out and dc'd. Fluids dc's, IV removed and Norma Taylor NP updated on request for paperwork.      Jennifer Arshad RN  07/16/21 2641
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
## 2022-01-24 ENCOUNTER — HOSPITAL ENCOUNTER (EMERGENCY)
Age: 47
Discharge: HOME | End: 2022-01-24
Attending: EMERGENCY MEDICINE
Payer: MEDICAID

## 2022-01-24 VITALS
RESPIRATION RATE: 16 BRPM | OXYGEN SATURATION: 97 % | TEMPERATURE: 98.2 F | DIASTOLIC BLOOD PRESSURE: 102 MMHG | WEIGHT: 174 LBS | BODY MASS INDEX: 24.91 KG/M2 | HEIGHT: 70 IN | SYSTOLIC BLOOD PRESSURE: 170 MMHG | HEART RATE: 65 BPM

## 2022-01-24 DIAGNOSIS — G47.00 INSOMNIA, UNSPECIFIED TYPE: Primary | ICD-10-CM

## 2022-01-24 DIAGNOSIS — F41.0 PANIC ATTACK: ICD-10-CM

## 2022-01-24 DIAGNOSIS — I10 ESSENTIAL HYPERTENSION: ICD-10-CM

## 2022-01-24 PROCEDURE — 99283 EMERGENCY DEPT VISIT LOW MDM: CPT

## 2022-01-24 RX ORDER — LORAZEPAM 0.5 MG/1
1 TABLET ORAL EVERY 8 HOURS PRN
Qty: 3 TABLET | Refills: 0 | Status: SHIPPED | OUTPATIENT
Start: 2022-01-24 | End: 2022-01-28 | Stop reason: ALTCHOICE

## 2022-01-24 ASSESSMENT — PAIN SCALES - GENERAL: PAINLEVEL_OUTOF10: 8

## 2022-01-24 ASSESSMENT — PAIN DESCRIPTION - ORIENTATION: ORIENTATION: RIGHT;LEFT

## 2022-01-24 ASSESSMENT — PAIN DESCRIPTION - DESCRIPTORS: DESCRIPTORS: DULL;ACHING

## 2022-01-24 ASSESSMENT — PAIN DESCRIPTION - PAIN TYPE: TYPE: ACUTE PAIN

## 2022-01-24 ASSESSMENT — PAIN DESCRIPTION - LOCATION: LOCATION: LEG

## 2022-01-24 NOTE — ED NOTES
Patient provided with discharge, followup and medication administration. Patient verbalized understanding. Patient alert and oriented x4 upon discharge. Patient ambulated out of ED with steady gait, no deficits noted by this RN.      Cristina JuniorMer[facility]  01/24/22 0958

## 2022-01-24 NOTE — ED PROVIDER NOTES
[address] [facility]  ED PROVIDER NOTE    Patient Identification  Pt Name: Abilio Lea  MRN: [id number]  [place] 1975  Date of evaluation: 1/24/2022  Provider: Sridevi Castillo MD  PCP: No primary care provider on file. Chief Complaint  Other (states that \"I had a PTSD attack on Saturday and now I can't sleep. I've tried smoking pot to help me sleep but I just can't slow my mind down\")      HPI  History provided by patient   This is a 51 y.o. male who presents to the ED for panic attack on Saturday when he was pulled over by  for speeding. Has history of PTSD. Since then, he has been having insomnia. He just want something to help him sleep for couple nights. Denies any symptoms at this time. No fevers or chills. No suicidal or homicidal ideation. Is not currently on any medications except for medical marijuana. ROS  12 systems reviewed, pertinent positives/negatives per HPI otherwise noted to be negative.     I have reviewed the following nursing documentation:  Allergies: Topamax [topiramate], Vicodin [hydrocodone-acetaminophen], and Moxifloxacin    Past medical history:   Past Medical History:   Diagnosis Date    Chronic back pain     History of substance abuse ([facility] [address]     meth, opiates, sober since 2019    Hypertension     Migraine     Osteomyelitis of left foot ([facility] [address]     2019    Pneumonia     PTSD (post-traumatic stress disorder)     per psychiatry history    Suicidal ideation 06/2020    history of    Tendonitis     left wrist    Toe fracture, right     2019     Past surgical history:   Past Surgical History:   Procedure Laterality Date    EYE SURGERY      \"lazy eye\"    FRACTURE SURGERY Left 12/12/2017    orif- Bosch's fracture    NASAL SEPTUM SURGERY      NOSE SURGERY  2014    OTHER SURGICAL HISTORY Left 2017    thumb, fracture ,       Home medications:   Previous Medications    NAPROXEN (NAPROSYN) 500 MG TABLET    Take 1 tablet by mouth 2 times daily (with meals) Social history:  reports that he has been smoking cigarettes. He has a 28.00 pack-year smoking history. He has never used smokeless tobacco. He reports previous drug use. Drugs: Marijuana (Weed) and Methamphetamines (Crystal Meth). He reports that he does not drink alcohol. Family history:    Family History   Problem Relation Age of Onset    Heart Failure Mother     Heart Disease Mother          Exam  ED Triage Vitals [01/24/22 0530]   BP Temp Temp Source Pulse Resp SpO2 Height Weight   (!) 170/102 98.2 °F (36.8 °C) Oral 65 16 97 % 5' 10\" (1.778 m) 174 lb (78.9 kg)     Nursing note and vitals reviewed. Constitutional: In no acute distress  HENT:      Head: Normocephalic      Ears: External ears normal.      Nose: Nose normal.     Mouth: Membrane mucosa moist   Eyes: No discharge. Neck: Supple. Trachea midline. Cardiovascular: Regular rate. Warm extremities  Pulmonary/Chest: Effort normal. No respiratory distress. Abdominal: Soft. No distension. Nontender  : Deferred  Rectal: Deferred   Musculoskeletal: Moves all extremities. No gross deformity. Neurological: Alert and oriented. Face symmetric. Speech is clear. Skin: Warm and dry. Psychiatric: Normal mood and affect. Behavior is normal.    Procedures            Radiology  No orders to display       Labs  No results found for this visit on 01/24/22. Screenings           MDM and ED Course  This is a 51 y.o. male who presents to the ED for panic attack  No suicidal homicidal ideation. He does have hypertension here which is likely secondary to his anxiety. Not in any pain. No symptoms other than anxiety. He is asymptomatic from the hypertension. He is able to follow-up with a doctor for his blood pressure. Will give 3 tabs of Ativan. Patient counseled on how to use this for panic attack. [unfilled]    Final Impression  1. Insomnia, unspecified type    2. Panic attack    3.  Essential hypertension        Blood pressure (!) 170/102, pulse 65, temperature 98.2 °F (36.8 °C), temperature source Oral, resp. rate 16, height 5' 10\" (1.778 m), weight 174 lb (78.9 kg), SpO2 97 %. Disposition:  DISPOSITION Decision To Discharge 01/24/2022 05:58:08 AM      Patient Referrals:  No follow-up provider specified. Discharge Medications:  New Prescriptions    LORAZEPAM (ATIVAN) 0.5 MG TABLET    Take 2 tablets by mouth every 8 hours as needed for Anxiety for up to 30 days. Discontinued Medications:  Discontinued Medications    No medications on file       This chart was generated using the [address] [place] [address] dictation system. I created this record but it may contain dictation errors given the limitations of this technology.         Colt Young MD  01/24/22 9509

## 2022-01-28 ENCOUNTER — HOSPITAL ENCOUNTER (EMERGENCY)
Age: 47
Discharge: OTHER INSTITUTION | End: 2022-01-29
Attending: EMERGENCY MEDICINE
Payer: MEDICAID

## 2022-01-28 DIAGNOSIS — F39 MOOD DISORDER (HCC): ICD-10-CM

## 2022-01-28 DIAGNOSIS — S01.01XA LACERATION OF SCALP, INITIAL ENCOUNTER: Primary | ICD-10-CM

## 2022-01-28 PROCEDURE — 99284 EMERGENCY DEPT VISIT MOD MDM: CPT

## 2022-01-28 PROCEDURE — 12002 RPR S/N/AX/GEN/TRNK2.6-7.5CM: CPT

## 2022-01-28 RX ORDER — LORAZEPAM 1 MG/1
TABLET ORAL
Status: COMPLETED
Start: 2022-01-28 | End: 2022-01-29

## 2022-01-28 RX ORDER — LORAZEPAM 1 MG/1
1 TABLET ORAL ONCE
Status: COMPLETED | OUTPATIENT
Start: 2022-01-29 | End: 2022-01-29

## 2022-01-28 ASSESSMENT — PAIN SCALES - GENERAL: PAINLEVEL_OUTOF10: 10

## 2022-01-28 ASSESSMENT — PAIN DESCRIPTION - PAIN TYPE: TYPE: ACUTE PAIN

## 2022-01-28 ASSESSMENT — PAIN DESCRIPTION - LOCATION: LOCATION: SHOULDER

## 2022-01-28 ASSESSMENT — PAIN DESCRIPTION - ORIENTATION: ORIENTATION: RIGHT

## 2022-01-29 ENCOUNTER — APPOINTMENT (OUTPATIENT)
Dept: GENERAL RADIOLOGY | Age: 47
End: 2022-01-29
Payer: MEDICAID

## 2022-01-29 ENCOUNTER — APPOINTMENT (OUTPATIENT)
Dept: CT IMAGING | Age: 47
End: 2022-01-29
Payer: MEDICAID

## 2022-01-29 VITALS
TEMPERATURE: 97.6 F | WEIGHT: 174 LBS | OXYGEN SATURATION: 98 % | DIASTOLIC BLOOD PRESSURE: 102 MMHG | BODY MASS INDEX: 24.97 KG/M2 | RESPIRATION RATE: 18 BRPM | HEART RATE: 88 BPM | SYSTOLIC BLOOD PRESSURE: 156 MMHG

## 2022-01-29 PROCEDURE — 6360000002 HC RX W HCPCS: Performed by: EMERGENCY MEDICINE

## 2022-01-29 PROCEDURE — 90715 TDAP VACCINE 7 YRS/> IM: CPT | Performed by: EMERGENCY MEDICINE

## 2022-01-29 PROCEDURE — 70450 CT HEAD/BRAIN W/O DYE: CPT

## 2022-01-29 PROCEDURE — 6370000000 HC RX 637 (ALT 250 FOR IP)

## 2022-01-29 PROCEDURE — 90471 IMMUNIZATION ADMIN: CPT | Performed by: EMERGENCY MEDICINE

## 2022-01-29 PROCEDURE — 73030 X-RAY EXAM OF SHOULDER: CPT

## 2022-01-29 RX ADMIN — LORAZEPAM 1 MG: 1 TABLET ORAL at 00:01

## 2022-01-29 RX ADMIN — TETANUS TOXOID, REDUCED DIPHTHERIA TOXOID AND ACELLULAR PERTUSSIS VACCINE, ADSORBED 0.5 ML: 5; 2.5; 8; 8; 2.5 SUSPENSION INTRAMUSCULAR at 00:11

## 2022-01-29 NOTE — ED PROVIDER NOTES
[address] ED      CHIEF COMPLAINT  Head Laceration (brought in by police for head lac. pt picked up from [facility] for warrant to be arrested. )       [facility]  Alisha Jones is a 51 y.o. male with history of substance use who is brought in by police for evaluation of head laceration. Per police, patient was just seen at outside hospital for clearance before correction because he was complaining of right shoulder pain. X-rays were negative. He was put in the police car and was on their way to correction when he banged his head against the partition and was noted to have a scalp laceration. He was in the car so no other injuries. Patient is quite agitated upon arrival.  Reports he is having pain and is feeling emotional and feels like he is having flashbacks from his PTSD. Says he is having pain to the right shoulder and was told that he has a shoulder dislocation. Says he has been does not want to go to correction and asks for police to leave and asks if I could keep him in the hospital.  He also states that he would like to get some medication to help him calm down. No other complaints, modifying factors or associated symptoms. I have reviewed the following from the nursing documentation.     Past Medical History:   Diagnosis Date    Chronic back pain     History of substance abuse ([address]     meth, opiates, sober since 2019    Hypertension     Migraine     Osteomyelitis of left foot ([address]     2019    Pneumonia     PTSD (post-traumatic stress disorder)     per psychiatry history    Suicidal ideation 06/2020    history of    Tendonitis     left wrist    Toe fracture, right     2019     Past Surgical History:   Procedure Laterality Date    EYE SURGERY      \"lazy eye\"    FRACTURE SURGERY Left 12/12/2017    orif- Bosch's fracture    NASAL SEPTUM SURGERY      NOSE SURGERY  2014    OTHER SURGICAL HISTORY Left 2017    thumb, fracture ,     Family History   Problem Relation Age of Onset    Heart Failure Mother     Heart Disease Mother      Social History     Socioeconomic History    Marital status: Single     Spouse name: Not on file    Number of children: 0    Years of education: Not on file    Highest education level: Not on file   Occupational History    Occupation: unemployed   Tobacco Use    Smoking status: Current Every Day Smoker     Packs/day: 1.00     Years: 28.00     Pack years: 28.00     Types: Cigarettes    Smokeless tobacco: Never Used    Tobacco comment: not ready   Vaping Use    Vaping Use: Never used   Substance and Sexual Activity    Alcohol use: No    Drug use: Not Currently     Types: Marijuana (Weed), Methamphetamines (Crystal Meth)     Comment: 2019, sober    Sexual activity: Not on file     Comment: not asked   Other Topics Concern    Not on file   Social History Narrative    Not on file     Social Determinants of Health     Financial Resource Strain:     Difficulty of Paying Living Expenses: Not on file   Food Insecurity:     Worried About [address] in the Last Year: Not on file    Cheryl of Food in the Last Year: Not on file   Transportation Needs:     Lack of Transportation (Medical): Not on file    Lack of Transportation (Non-Medical):  Not on file   Physical Activity:     Days of Exercise per Week: Not on file    Minutes of Exercise per Session: Not on file   Stress:     Feeling of Stress : Not on file   Social Connections:     Frequency of Communication with Friends and Family: Not on file    Frequency of Social Gatherings with Friends and Family: Not on file    Attends Yazidi Services: Not on file    Active Member of Clubs or Organizations: Not on file    Attends Club or Organization Meetings: Not on file    Marital Status: Not on file   Intimate Partner Violence:     Fear of Current or Ex-Partner: Not on file    Emotionally Abused: Not on file    Physically Abused: Not on file    Sexually Abused: Not on file   Housing Stability:     Unable to Pay for Housing in the Last Year: Not on file    Number of Places Lived in the Last Year: Not on file    Unstable Housing in the Last Year: Not on file     No current facility-administered medications for this encounter. No current outpatient medications on file. Allergies   Allergen Reactions    Amoxicillin Anaphylaxis    Topamax [Topiramate] Shortness Of Breath and Other (See Comments)     Facial numbness    Metoprolol Other (See Comments)     Unable to say       Vicodin [Hydrocodone-Acetaminophen] Swelling    Moxifloxacin Rash     Other reaction(s): Other (See Comments)  coma       REVIEW OF SYSTEMS  10 systems reviewed, pertinent positives per HPI otherwise noted to be negative. PHYSICAL EXAM  BP (!) 199/104   Pulse 88   Temp 97.6 °F (36.4 °C) (Oral)   Resp 22   Wt 174 lb (78.9 kg)   SpO2 100%   BMI 24.97 kg/m²    GENERAL APPEARANCE: Awake and alert. No acute distress. HENT: Normocephalic. 3 cm left scalp laceration. No active bleeding. No hematoma present. PERRL. EOMI. No facial droop. HEART/CHEST: RRR. LUNGS: Respirations unlabored. Speaking comfortably in full sentences. ABDOMEN: Soft, non-distended abdomen. Non tender to palpation. No guarding. No rebound. EXTREMITIES: No gross deformities. Moving all extremities. SKIN: Warm and dry. No acute rashes. NEUROLOGICAL: Alert and oriented. No gross facial drooping. Answering questions appropriately. Moving all extremities. PSYCHIATRIC: agitated. Labile. LABS  No results found for this visit on 01/28/22. I have reviewed all labs for this visit. RADIOLOGY  XR SHOULDER RIGHT (MIN 2 VIEWS)    Result Date: 1/29/2022  1. No acute osseous abnormality of the right shoulder. 2. Mild acromioclavicular osteoarthritis. CT HEAD WO CONTRAST    Result Date: 1/29/2022  No acute intracranial abnormality. ED COURSE/MDM  Patient seen and evaluated.  At presentation, patient was agitated, candidag. Patient has discharge paperwork from another hospital where he was just evaluated for a right shoulder pain. X-ray was negative and released to police to go to shelter. Patient hit his head against the partition in the police car and was noted to have a scalp laceration and brought to ED. patient is labile and goes from begging not to the police take him to agitated and saying that police abused him to saying he has other injuries. Says he has a shoulder dislocation. Then I was told by police that he was just seen at OSH and had XR showing no dislocation or fracture and released. He has a 3 cm left scalp laceration. Not actively bleeding. No associated hematoma. CT head obtained and shows no acute intracranial bleed. This was discussed with patient. At this time, patient asks if he could see mental health because of his PTSD. When the  says they have 24/7 mental health professionals available in shelter, patient at this time says he is suicidal.  Per the  at bedside, they will pass along that he is suicidal and make sure he is on suicide watch. Laceration was irrigated and staples were placed. He was given wound care instructions and instructed up with staples removed in 10 to 14 days. I again reiterated to the police officers and on the discharge paperwork that patient needs to be on suicide watch and will need evaluation at shelter by a mental health professional.  They verbalized understanding. PROCEDURE:  LACERATION REPAIR  Kristina El or their surrogate had an opportunity to ask questions, and the risks, benefits, and alternatives were discussed. The wound was prepped and draped to maintain a sterile field. It was copiously irrigated. It was explored to its subcutaneous depth in a bloodless field with no sign of tendon, nerve, or vascular injury. No foreign bodies were identified. It was closed with staples. There were no complications during the procedure.     Pt was seen during the [place] 19 pandemic. Appropriate PPE worn by ME during patient encounters. Patient was cared for during a time with constrained hospital bed capacity with nationwide stress on resources and staffing. During the patient's ED course, the patient was given:  Medications   LORazepam (ATIVAN) tablet 1 mg (1 mg Oral Given 1/29/22 0001)   Tetanus-Diphth-Acell Pertussis (BOOSTRIX) injection 0.5 mL (0.5 mLs IntraMUSCular Given 1/29/22 0011)        CLINICAL IMPRESSION  1. Laceration of scalp, initial encounter    2. Mood disorder (HCC)        Blood pressure (!) 199/104, pulse 88, temperature 97.6 °F (36.4 °C), temperature source Oral, resp. rate 22, weight 174 lb (78.9 kg), SpO2 100 %. Ceferino Burciaga was released to police custody to be taken to senior living. Patient was given scripts for the following medications. I counseled patient how to take these medications. New Prescriptions    No medications on file       Follow-up with:  No follow-up provider specified. DISCLAIMER: This chart was created using Dragon dictation software. Efforts were made by me to ensure accuracy, however some errors may be present due to limitations of this technology and occasionally words are not transcribed correctly.        Frances Dixon MD  01/29/22 4006

## 2022-01-29 NOTE — ED NOTES
This RN and 2nd RN able to get multiple shirts off of pt in order to get pt to CT. Pt capillary refill brisk to several digits, radial pulse strong, R hand warm to the touch, able to move digits.  Police in room as pt is handcuffed and under arrest.     [facility]  01/29/22 0008

## 2022-01-29 NOTE — ED NOTES
Laceration irrigated and leif placed per Dr Shaheed Buckner.      John Paul Rubio, WILL  01/29/22 2259

## 2022-01-29 NOTE — ED NOTES
Pt given d/c instructions, but refusing to verbalize understanding. Instructions given to Miguel. When pt informed that he was going to snf no matter what, pt insisting on talking with \"mental health\"  When Md kept asking pt why, he just kept saying I need to talk to mental health. When Dr Saeid Joel talking with patient about care of staples, and pt stating again that he wants to talk to mental health, pt then stated that he was \"suicidal\" and wanted to talk to mental health. Police officers notified staff  that the snf has a nurse available 24/7 and  mental health as well. Pt can be placed on suicide precautions at the snf.      Irish Ellsworth RN  01/29/22 2983

## 2022-01-29 NOTE — ED NOTES
Pt discharged per order. Discharge instructions reviewed with pt and police, understanding voiced.       Diana Felix RN  01/29/22 0482

## 2022-04-20 ENCOUNTER — HOSPITAL ENCOUNTER (EMERGENCY)
Age: 47
Discharge: HOME | End: 2022-04-20
Attending: STUDENT IN AN ORGANIZED HEALTH CARE EDUCATION/TRAINING PROGRAM
Payer: MEDICAID

## 2022-04-20 VITALS
BODY MASS INDEX: 28 KG/M2 | DIASTOLIC BLOOD PRESSURE: 91 MMHG | HEART RATE: 71 BPM | RESPIRATION RATE: 16 BRPM | WEIGHT: 200 LBS | SYSTOLIC BLOOD PRESSURE: 166 MMHG | TEMPERATURE: 98.6 F | OXYGEN SATURATION: 99 % | HEIGHT: 71 IN

## 2022-04-20 DIAGNOSIS — G44.329 CHRONIC POST-TRAUMATIC HEADACHE, NOT INTRACTABLE: Primary | ICD-10-CM

## 2022-04-20 DIAGNOSIS — H93.12 TINNITUS OF LEFT EAR: ICD-10-CM

## 2022-04-20 PROCEDURE — 96372 THER/PROPH/DIAG INJ SC/IM: CPT

## 2022-04-20 PROCEDURE — 99284 EMERGENCY DEPT VISIT MOD MDM: CPT

## 2022-04-20 PROCEDURE — 6370000000 HC RX 637 (ALT 250 FOR IP): Performed by: STUDENT IN AN ORGANIZED HEALTH CARE EDUCATION/TRAINING PROGRAM

## 2022-04-20 RX ORDER — CARIPRAZINE 1.5 MG/1
3 CAPSULE, GELATIN COATED ORAL DAILY
COMMUNITY
Start: 2022-04-12

## 2022-04-20 RX ORDER — AMITRIPTYLINE HYDROCHLORIDE 25 MG/1
1 TABLET, FILM COATED ORAL DAILY
COMMUNITY
Start: 2022-04-07

## 2022-04-20 RX ORDER — ONDANSETRON 4 MG/1
1 TABLET, FILM COATED ORAL PRN
COMMUNITY
Start: 2022-04-13

## 2022-04-20 RX ORDER — SUMATRIPTAN 50 MG/1
1 TABLET, FILM COATED ORAL PRN
COMMUNITY
Start: 2022-04-13

## 2022-04-20 RX ORDER — SUMATRIPTAN 6 MG/.5ML
6 INJECTION, SOLUTION SUBCUTANEOUS ONCE
Status: COMPLETED | OUTPATIENT
Start: 2022-04-20 | End: 2022-04-20

## 2022-04-20 RX ORDER — ACETAMINOPHEN 500 MG
1000 TABLET ORAL ONCE
Status: COMPLETED | OUTPATIENT
Start: 2022-04-20 | End: 2022-04-20

## 2022-04-20 RX ORDER — PRAZOSIN HYDROCHLORIDE 1 MG/1
1 CAPSULE ORAL DAILY
COMMUNITY
Start: 2022-04-19

## 2022-04-20 RX ADMIN — SUMATRIPTAN 6 MG: 6 INJECTION, SOLUTION SUBCUTANEOUS at 08:54

## 2022-04-20 RX ADMIN — ACETAMINOPHEN 1000 MG: 500 TABLET ORAL at 08:58

## 2022-04-20 ASSESSMENT — PAIN DESCRIPTION - FREQUENCY: FREQUENCY: CONTINUOUS

## 2022-04-20 ASSESSMENT — PAIN DESCRIPTION - ONSET: ONSET: ON-GOING

## 2022-04-20 ASSESSMENT — PAIN SCALES - GENERAL: PAINLEVEL_OUTOF10: 9

## 2022-04-20 ASSESSMENT — PAIN DESCRIPTION - LOCATION: LOCATION: HEAD

## 2022-04-20 ASSESSMENT — PAIN - FUNCTIONAL ASSESSMENT: PAIN_FUNCTIONAL_ASSESSMENT: 0-10

## 2022-04-20 ASSESSMENT — PAIN DESCRIPTION - PAIN TYPE: TYPE: CHRONIC PAIN

## 2022-04-20 ASSESSMENT — PAIN DESCRIPTION - DESCRIPTORS: DESCRIPTORS: PRESSURE;OTHER (COMMENT)

## 2022-04-20 NOTE — ED PROVIDER NOTES
[facility]      CHIEF COMPLAINT  Migraine (pt state migraine started on jan 28th after head injury.  )     HISTORY OF PRESENT ILLNESS  Leticia Navarro is a 49 y.o. male  who presents to the ED complaining of headache. Patient states that he has a history of headaches. States that he is also had ringing in his left ear \"for a long time. \"  He states that the headache that he is having today has been ongoing since he had a head injury in January. States that he took some ibuprofen for it as well as Imitrex around 1:30 am which did not help with his pain. He denies any numbness or tingling. Denies vision changes but states that he is sensitive to light and sound. He is requesting referral to a primary care provider. He is also requesting a note to skip his classes today at the [employer] [email or web address] CHEMICAL DEPENDENCY [facility]. No other complaints, modifying factors or associated symptoms. I have reviewed the following from the nursing documentation.     Past Medical History:   Diagnosis Date    Chronic back pain     History of substance abuse ([facility]     meth, opiates, sober since 2019    Hypertension     Migraine     Osteomyelitis of left foot ([facility]     2019    Pneumonia     PTSD (post-traumatic stress disorder)     per psychiatry history    Suicidal ideation 06/2020    history of    Tendonitis     left wrist    Toe fracture, right     2019     Past Surgical History:   Procedure Laterality Date    EYE SURGERY      \"lazy eye\"    FRACTURE SURGERY Left 12/12/2017    orif- Bosch's fracture    NASAL SEPTUM SURGERY      NOSE SURGERY  2014    OTHER SURGICAL HISTORY Left 2017    thumb, fracture ,     Family History   Problem Relation Age of Onset    Heart Failure Mother     Heart Disease Mother      Social History     Socioeconomic History    Marital status: Single     Spouse name: Not on file    Number of children: 0    Years of education: Not on file    Highest education level: Not on file   Occupational History    Occupation: unemployed   Tobacco Use    Smoking status: Current Every Day Smoker     Packs/day: 1.00     Years: 28.00     Pack years: 28.00     Types: Cigarettes    Smokeless tobacco: Never Used    Tobacco comment: not ready   Vaping Use    Vaping Use: Never used   Substance and Sexual Activity    Alcohol use: No    Drug use: Not Currently     Types: Marijuana (Cyrena [place]athitt), Methamphetamines (Crystal Meth)     Comment: 2019, sober    Sexual activity: Not on file     Comment: not asked   Other Topics Concern    Not on file   Social History Narrative    Not on file     Social Determinants of Health     Financial Resource Strain:     Difficulty of Paying Living Expenses: Not on file   Food Insecurity:     Worried About Running Out of Food in the Last Year: Not on file    Cheryl of Food in the Last Year: Not on file   Transportation Needs:     Lack of Transportation (Medical): Not on file    Lack of Transportation (Non-Medical):  Not on file   Physical Activity:     Days of Exercise per Week: Not on file    Minutes of Exercise per Session: Not on file   Stress:     Feeling of Stress : Not on file   Social Connections:     Frequency of Communication with Friends and Family: Not on file    Frequency of Social Gatherings with Friends and Family: Not on file    Attends Adventist Services: Not on file    Active Member of [address] [place] [address] or Organizations: Not on file    Attends Club or Organization Meetings: Not on file    Marital Status: Not on file   Intimate Partner Violence:     Fear of Current or Ex-Partner: Not on file    Emotionally Abused: Not on file    Physically Abused: Not on file    Sexually Abused: Not on file   Housing Stability:     Unable to Pay for Housing in the Last Year: Not on file    Number of [place] in the Last Year: Not on file    Unstable Housing in the Last Year: Not on file     Current Facility-Administered Medications   Medication Dose Route Frequency Provider Last Rate Last Admin    acetaminophen (TYLENOL) tablet 1,000 mg  1,000 mg Oral Once Lou Powers MD        SUMAtriptan (IMITREX) injection 6 mg  6 mg SubCUTAneous Once Lou Powers MD         Current Outpatient Medications   Medication Sig Dispense Refill    VRAYLAR 1.5 MG capsule Take 1 capsule by mouth daily      amitriptyline (ELAVIL) 25 MG tablet Take 1 tablet by mouth daily      SUMAtriptan (IMITREX) 50 MG tablet Take 1 tablet by mouth as needed      prazosin (MINIPRESS) 1 MG capsule Take 1 capsule by mouth daily      ondansetron (ZOFRAN) 4 MG tablet Take 1 tablet by mouth as needed       Allergies   Allergen Reactions    Amoxicillin Anaphylaxis    Topamax [Topiramate] Shortness Of Breath and Other (See Comments)     Facial numbness    Metoprolol Other (See Comments)     Unable to say       Vicodin [Hydrocodone-Acetaminophen] Swelling    Moxifloxacin Rash     Other reaction(s): Other (See Comments)  coma       REVIEW OF SYSTEMS  10 systems reviewed, pertinent positives per HPI otherwise noted to be negative. PHYSICAL EXAM  BP (!) 166/94   Pulse 80   Temp 98.6 °F (37 °C) (Oral)   Resp 14   Ht 5' 11\" (1.803 m)   Wt 200 lb (90.7 kg)   SpO2 100%   BMI 27.89 kg/m²    GENERAL APPEARANCE: Awake and alert. Cooperative. No acute distress. HENT: Normocephalic. Atraumatic. L TM appears scarred. R TM clear. NECK: Supple. EYES: PERRL. EOM's grossly intact. HEART/CHEST: RRR. No murmurs. LUNGS: Respirations unlabored. CTAB. Good air exchange. Speaking comfortably in full sentences. ABDOMEN: No tenderness. Soft. Non-distended. No masses. No organomegaly. No guarding or rebound. MUSCULOSKELETAL: No extremity edema. Compartments soft. No deformity. No tenderness in the extremities. All extremities neurovascularly intact. SKIN: Warm and dry. No acute rashes. NEUROLOGICAL: Alert and oriented. CN's 2-12 intact. No gross facial drooping. Strength 5/5, sensation intact.  Gait normal.  PSYCHIATRIC: Normal mood and affect. LABS  I have reviewed all labs for this visit. No results found for this visit on 04/20/22. RADIOLOGY  No orders to display     ED COURSE/MDM  Patient seen and evaluated. Old records reviewed. Labs and imaging reviewed and results discussed with patient. Patient is a 39-year-old male presenting with concerns for headache that has been ongoing since January as well as chronic tinnitus in the left ear. Full HPI as detailed above. Upon arrival in the ED, vital signs remarkable for hypertension, otherwise reassuring. Patient is resting comfortably and is in no acute distress. Has normal neurologic exam.  No neck pain or stiffness. Afebrile and appears nontoxic. Vital signs were repeated and his blood pressure had decreased, this is been a chronic headache for the past 3 months and he had imaging performed after his head injury at that time do not feel that repeat imaging is indicated at this time. Patient will be treated with Tylenol and Imitrex. Will be given referral for PCP for follow-up, and additional ENT for his tinnitus. He is requesting a note to sleep his classes at the [facility] [employer] and I did inform him that we would not be providing a note for that today for his chronic headache. Patient will be given referrals for follow-up. Will be discharged. He is comfortable in agreement with plan of care. Given return precautions. During the patient's ED course, the patient was given:  Medications   acetaminophen (TYLENOL) tablet 1,000 mg (has no administration in time range)   SUMAtriptan (IMITREX) injection 6 mg (has no administration in time range)        CLINICAL IMPRESSION  1. Chronic post-traumatic headache, not intractable    2. Tinnitus of left ear        Blood pressure (!) 166/94, pulse 80, temperature 98.6 °F (37 °C), temperature source Oral, resp.  rate 14, height 5' 11\" (1.803 m), weight 200 lb (90.7 kg), SpO2 100 %. Ceferino Coker was [facility] condition. Patient was given scripts for the following medications. I counseled patient how to take these medications. New Prescriptions    No medications on file       Follow-up with:  [facility] Pre-Services  [phone]  Schedule an appointment as soon as possible for a visit       [place] [id number]. [facility]  [address]  [phone]  Go to   If symptoms worsen    [address] [place] [address]  [address] [id number]  [phone]  Schedule an appointment as soon as possible for a visit         DISCLAIMER: This chart was created using Dragon dictation software. Efforts were made by me to ensure accuracy, however some errors may be present due to limitations of this technology and occasionally words are not transcribed correctly.        Gab Cotton MD  04/20/22 9068

## 2022-05-02 ENCOUNTER — OFFICE VISIT (OUTPATIENT)
Dept: ENT CLINIC | Age: 47
End: 2022-05-02
Payer: MEDICAID

## 2022-05-02 ENCOUNTER — PROCEDURE VISIT (OUTPATIENT)
Dept: AUDIOLOGY | Age: 47
End: 2022-05-02
Payer: MEDICAID

## 2022-05-02 VITALS
BODY MASS INDEX: 26.94 KG/M2 | SYSTOLIC BLOOD PRESSURE: 146 MMHG | WEIGHT: 188.2 LBS | DIASTOLIC BLOOD PRESSURE: 87 MMHG | HEIGHT: 70 IN | TEMPERATURE: 97.6 F | HEART RATE: 89 BPM

## 2022-05-02 DIAGNOSIS — H90.3 SENSORINEURAL HEARING LOSS (SNHL) OF BOTH EARS: Primary | ICD-10-CM

## 2022-05-02 DIAGNOSIS — J34.89 NASAL VALVE COLLAPSE: ICD-10-CM

## 2022-05-02 DIAGNOSIS — H92.02 OTALGIA OF LEFT EAR: ICD-10-CM

## 2022-05-02 DIAGNOSIS — H93.13 TINNITUS OF BOTH EARS: ICD-10-CM

## 2022-05-02 DIAGNOSIS — J34.3 HYPERTROPHY OF INFERIOR NASAL TURBINATE: ICD-10-CM

## 2022-05-02 DIAGNOSIS — H93.8X2 EAR PRESSURE, LEFT: ICD-10-CM

## 2022-05-02 DIAGNOSIS — H90.3 SENSORINEURAL HEARING LOSS, BILATERAL: Primary | ICD-10-CM

## 2022-05-02 DIAGNOSIS — H93.12 TINNITUS OF LEFT EAR: ICD-10-CM

## 2022-05-02 PROCEDURE — 99204 OFFICE O/P NEW MOD 45 MIN: CPT | Performed by: STUDENT IN AN ORGANIZED HEALTH CARE EDUCATION/TRAINING PROGRAM

## 2022-05-02 PROCEDURE — 92567 TYMPANOMETRY: CPT | Performed by: AUDIOLOGIST

## 2022-05-02 PROCEDURE — G8419 CALC BMI OUT NRM PARAM NOF/U: HCPCS | Performed by: STUDENT IN AN ORGANIZED HEALTH CARE EDUCATION/TRAINING PROGRAM

## 2022-05-02 PROCEDURE — G8427 DOCREV CUR MEDS BY ELIG CLIN: HCPCS | Performed by: STUDENT IN AN ORGANIZED HEALTH CARE EDUCATION/TRAINING PROGRAM

## 2022-05-02 PROCEDURE — 4004F PT TOBACCO SCREEN RCVD TLK: CPT | Performed by: STUDENT IN AN ORGANIZED HEALTH CARE EDUCATION/TRAINING PROGRAM

## 2022-05-02 PROCEDURE — 92557 COMPREHENSIVE HEARING TEST: CPT | Performed by: AUDIOLOGIST

## 2022-05-02 RX ORDER — LORATADINE 10 MG/1
10 TABLET ORAL DAILY
COMMUNITY

## 2022-05-02 ASSESSMENT — ENCOUNTER SYMPTOMS
VOMITING: 0
COUGH: 0
RHINORRHEA: 0
EYE PAIN: 0
SHORTNESS OF BREATH: 0
NAUSEA: 0

## 2022-05-02 NOTE — PATIENT INSTRUCTIONS
Good Communication Strategies    Communication can be challenging for anyone, but can be especially difficult for those with some degree of hearing loss. While we may not be able to control every factor that may lead to difficulty with communication, there are Good Communication Strategies that we can all use in our day-to-day lives. Communication takes both parties working together for it to be successful. Tips as a Listener:   1. Control your environment. It is important to limit the amount of background noise in the room when possible. You should also consider having a good light source in the room to best see the other person. 2. Ask for clarification. Instead of saying \"What?\", you can use parts of what you heard to make a new question. For example, if you heard the word \"Thursday\" but not the rest of the week, you may ask \"What was that about Thursday? \" or \"What did you want to do Thursday? \". This shows the person talking that you are listening and will help them better explain what they are saying. 3. Be an advocate for yourself. If you are hearing but not understanding, tell the other person \"I can hear you, but I need you to slow down when you speak. \"  Or if someone is facing the other direction, say \"I cannot hear you when you are not looking at me when we talk. \"       Tips as a Talker:   - Sit or stand 3 to 6 feet away to maximize audibility         -- It is unrealistic to believe someone else will fully hear your message if you are speaking from across the room or in a different room in the house   - Stay at eye level to help with visual cues   - Make sure you have the persons attention before speaking   - Use facial expressions and gestures to accentuate your message   - Raise your voice slightly (do not scream)   - Speak slowly and distinctly   - Use short, simple sentences   - Rephrase your words if the person is having a hard time understanding you    - To avoid distortion, dont speak directly into a persons ear      Some additional items that may be helpful:   - Remain patient - this is important for both parties   - Write down items that still cannot be heard/understood. You may write with pen/paper or consider typing/texting on a cell phone or smart device. - If background noise is unavoidable, try to keep yourself in a good position in the room. By sitting at a hernandez on the side of the restaurant (preferably a corner), it will be easier to communicate than if you were sitting at a table in the middle with background noise surrounding you. Keep yourself positioned away from music speakers or heavy foot traffic. Tinnitus: Overview and Management Strategies          Many people have some ringing sounds in their ears once in a while. You may hear a roar, a hiss, a tinkle, or a buzz. The sound usually lasts only a few minutes. If it goes on all the time, you may have tinnitus. Tinnitus is usually caused by long-term exposure to loud noise. This damages the nerves in the inner ear. It can occur with all types of hearing loss. It may be a symptom of almost any ear problem. Tinnitus may be caused by a buildup of earwax. Or, it may be caused by ear infections or certain medicines (especially antibiotics or large amounts of aspirin). You can also hear noises in your ears because of an injury to the ears, drinking too much alcohol or caffeine, or a medical condition. Other conditions may also contribute to tinnitus, including: head and neck trauma, temporomandibular joint disorder (TMJ), sinus pressure and barometric trauma, traumatic brain injury, metabolic disorders, autoimmune disorders, stress, and high blood pressure. You may need tests to evaluate your hearing and to find causes of long-lasting tinnitus. Your doctor may suggest one or more treatments to help you cope with the tinnitus. You can also do things at home to help reduce symptoms.     Follow-up care is a key part of your treatment and safety. Be sure to make and go to all appointments, and call your doctor if you are having problems. It's also a good idea to know your test results and keep a list of the medicines you take. How can you care for yourself at home? · Limit or cut out alcohol, caffeine, and sodium. They can make your symptoms worse. · Do not smoke or use other tobacco products. Nicotine reduces blood flow to the ear and makes tinnitus worse. If you need help quitting, talk to your doctor about stop-smoking programs and medicines. These can increase your chances of quitting for good. · Talk to your doctor about whether to stop taking aspirin and similar products such as ibuprofen or naproxen. · Get exercise often. It can help improve blood flow to the ear. Ways to manage/cope with tinnitus  Some tinnitus may last a long time. To manage your tinnitus, try to:  · Avoid noises that you think caused your tinnitus. If you can't avoid loud noises, wear earplugs or earmuffs. · Ignore the sound by paying attention to other things. Keeping your brain busy with other tasks or background noise can help your brain not focus on the tinnitus. · Try to not give the tinnitus an emotional reaction. Do your best to ignore the sound and not let it bother you. Relax using biofeedback, meditation, or yoga. Feeling stressed and being tired can make tinnitus worse. · Play music or white noise to help you sleep. Background noise may cover up the noise that you hear in your ears. You can buy a tabletop machine or a device that sits under your pillow to play soothing sounds, like ocean waves. · Smart phones have free apps, such as Whist, Relax Melodies, ReSound Relief, and White Noise Lite. These apps have different types of sounds/noise, some of which you can blend together to find sounds that are most soothing to you.   · Hearing aid technology, especially when there is some hearing loss, may help reduce tinnitus symptoms by giving your brain better access to the sounds it is missing. There are some hearing aids with built-in noise generator programs, which may help when amplification alone is not enough. Additional resources may be found through the [employer] at www.abbie.org    When should you call for help? Call 911 anytime you think you may need emergency care. For example, call if:    · You have symptoms of a stroke. These may include:  ? Sudden numbness, tingling, weakness, or loss of movement in your face, arm, or leg, especially on only one side of your body. ? Sudden vision changes. ? Sudden trouble speaking. ? Sudden confusion or trouble understanding simple statements. ? Sudden problems with walking or balance. ? A sudden, severe headache that is different from past headaches. Call your doctor now or seek immediate medical care if:    · You develop other symptoms. These may include hearing loss (or worse hearing loss), balance problems, dizziness, nausea, or vomiting. Watch closely for changes in your health, and be sure to contact your doctor if:    · Your tinnitus moves from both ears to one ear. · Your hearing loss gets worse within 1 day after an ear injury. · Your tinnitus or hearing loss does not get better within 1 week after an ear injury. · Your tinnitus bothers you enough that you want to take medicines to help you cope with it. If you notice changes in your tinnitus and/or your hearing, it is recommended that you have your hearing tested by your audiologist and to follow-up with your physician that manages your hearing loss (such as your ENT or Primary Care doctor).

## 2022-05-02 NOTE — PROGRESS NOTES
Andrés Couch   1975, 49 y.o. male   [id number]       Referring Provider: Katya Cunningham DO  Referral Type: In an order in [address] [place] [address]    Reason for Visit: Evaluation of the cause of disorders of hearing, tinnitus, or balance. ADULT AUDIOLOGIC EVALUATION      Andrés Couch is a 49 y.o. male seen today, 5/2/2022 , for an initial audiologic evaluation. Patient was seen by Katya Cunningham DO following today's evaluation. Patient was alone. AUDIOLOGIC AND OTHER PERTINENT MEDICAL HISTORY:      Andrés Couch noted otalgia, aural fullness, tinnitus, history of occupational noise exposure and history of head trauma. Patient reports constant tinnitus in the left ear becoming louder leaning his head back after a head injury around three years ago. He noted left ear otalgia and aural fullness. 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 Medical history is reportedly significant for migraines. Andrés Couch denied dizziness, history of ear surgery and family history of hearing loss. Date: 5/2/2022     IMPRESSIONS:      Normal middle ear pressure and compliance, bilaterally. Abnormal asymmetric high frequency hearing sensitivity, left worse than right, which can affect constant tinnitus in the left ear. Word understanding was excellent presented at elevated sensation levels, bilaterally. [facility] to discuss hearing aid options once medical clearance is given. Follow medical recommendations of Nghia Jung DO.     ASSESSMENT AND FINDINGS:     Otoscopy revealed: Clear ear canals bilaterally    RIGHT EAR:  Hearing Sensitivity:Normal hearing sensitivity to a mild sensorineural hearing loss from 250-8000 Hz  Speech Recognition Threshold: 20 dB HL  Word Recognition:Excellent (100%), based on NU-6 25-word list at 60 dBHL using recorded speech stimuli.     Tympanometry: Normal peak pressure and compliance, Type A tympanogram, consistent with normal middle ear function. LEFT EAR:  Hearing Sensitivity:Normal hearing sensitivity sloping to a moderately severe sensorineural hearing loss from 250-8000 Hz  Speech Recognition Threshold: 20 dB HL  Word Recognition: Excellent (100%), based on NU-6 25-word list at 60 dBHL using recorded speech stimuli. Tympanometry: Normal peak pressure and compliance, Type A tympanogram, consistent with normal middle ear function. NOTE: An asymmetry of  >10 dBHL 3240-4709 Hz is present with left ear worse than right ear. Reliability: Good   Transducer: Inserts    See scanned audiogram dated 5/2/2022  for results. PATIENT EDUCATION:       The following items were discussed with the patient:   - Good Communication Strategies  - Hearing Loss and Hearing Aids  - Tinnitus Management Strategies      Educational information was shared in the After Visit Summary. RECOMMENDATIONS:                                                                                                                                                                                                                                                            The following items are recommended based on patient report and results from today's appointment:   - Continue medical follow-up with Aristides Garcia DO.   - Retest hearing as medically indicated and/or sooner if a change in hearing is noted. - Referred to the [facility] to schedule a Hearing Aid Evaluation (HAE) appointment to discuss hearing aid options once medical clearance is given. - Utilize \"Good Communication Strategies\" as discussed to assist in speech understanding with communication partners. - Maintain a sound enriched environment to assist in the management of tinnitus symptoms.        Myrna Hughes  Audiologist    Chart CC'd to: Aristides Garcia DO      Degree of   Hearing Sensitivity dB Range   Within Normal Limits (WNL) 0 - 20   Mild 20 - 40   Moderate 40 - 55   Moderately-Severe 55 - 70   Severe 70 - 90   Profound 90 +

## 2022-05-12 ENCOUNTER — CARE COORDINATION (OUTPATIENT)
Dept: CARE COORDINATION | Age: 47
End: 2022-05-12

## 2022-05-19 ENCOUNTER — CARE COORDINATION (OUTPATIENT)
Dept: CARE COORDINATION | Age: 47
End: 2022-05-19

## 2022-11-16 ENCOUNTER — HOSPITAL ENCOUNTER (EMERGENCY)
Age: 47
Discharge: HOME | End: 2022-11-16
Attending: EMERGENCY MEDICINE
Payer: MEDICAID

## 2022-11-16 VITALS
WEIGHT: 200 LBS | OXYGEN SATURATION: 100 % | SYSTOLIC BLOOD PRESSURE: 180 MMHG | HEART RATE: 65 BPM | HEIGHT: 71 IN | DIASTOLIC BLOOD PRESSURE: 102 MMHG | TEMPERATURE: 97.7 F | RESPIRATION RATE: 16 BRPM | BODY MASS INDEX: 28 KG/M2

## 2022-11-16 DIAGNOSIS — U07.1 COVID-19: Primary | ICD-10-CM

## 2022-11-16 LAB
RAPID INFLUENZA  B AGN: NEGATIVE
RAPID INFLUENZA A AGN: NEGATIVE
S PYO AG THROAT QL: NEGATIVE
SARS-COV-2, NAAT: DETECTED

## 2022-11-16 PROCEDURE — 87635 SARS-COV-2 COVID-19 AMP PRB: CPT

## 2022-11-16 PROCEDURE — 87804 INFLUENZA ASSAY W/OPTIC: CPT

## 2022-11-16 PROCEDURE — 99283 EMERGENCY DEPT VISIT LOW MDM: CPT

## 2022-11-16 PROCEDURE — 87081 CULTURE SCREEN ONLY: CPT

## 2022-11-16 PROCEDURE — 87880 STREP A ASSAY W/OPTIC: CPT

## 2022-11-16 ASSESSMENT — PAIN - FUNCTIONAL ASSESSMENT: PAIN_FUNCTIONAL_ASSESSMENT: NONE - DENIES PAIN

## 2022-11-16 ASSESSMENT — ENCOUNTER SYMPTOMS
COUGH: 1
SHORTNESS OF BREATH: 0
RHINORRHEA: 1
EYE REDNESS: 0
WHEEZING: 0
TROUBLE SWALLOWING: 0
ABDOMINAL PAIN: 0
CHOKING: 0

## 2022-11-16 NOTE — ED PROVIDER NOTES
[address] [facility] Name: Kimberly Velazquez  MRN: [id number]  [place] 1975  Date of evaluation: 11/16/2022  Provider: Kennedy Martin MD    CHIEF COMPLAINT       Chief Complaint   Patient presents with    Nasal Congestion     Nasal congestion and cough x 3-4 days. HISTORY OF PRESENT ILLNESS   (Location/Symptom, Timing/Onset, Context/Setting, Quality, Duration, Modifying Factors, Severity)  Note limiting factors. Kimberly Velazquez is a 49 y.o. male who presents to the emergency department     Patient presents emergency department history of having substance abuse in the past he has been sober since 2019 presents with generalized myalgias fever mild sore throat runny nose. He is not immunocompromise  Does have a history of mild high blood pressure presents with stable vital signs his pulse ox on room air is 100% he is coughing he does endorse he smokes  No other medical problems no rashes blood pressure 180/102 temperature is afebrile heart rate 65 respiration 16    The history is provided by the patient. Nursing Notes were reviewed. REVIEW OF SYSTEMS    (2-9 systems for level 4, 10 or more for level 5)     Review of Systems   Constitutional:  Positive for activity change, appetite change, chills, fatigue and fever. Negative for diaphoresis and unexpected weight change. HENT:  Positive for congestion, postnasal drip, rhinorrhea and sneezing. Negative for trouble swallowing. Eyes:  Negative for redness and visual disturbance. Respiratory:  Positive for cough. Negative for choking, shortness of breath and wheezing. Cardiovascular:  Negative for chest pain. Gastrointestinal:  Negative for abdominal pain. Genitourinary:  Negative for flank pain. Musculoskeletal:  Negative for gait problem. Skin:  Negative for pallor, rash and wound. Allergic/Immunologic: Negative for immunocompromised state.    Neurological:  Positive for light-headedness. Negative for speech difficulty and weakness. All other systems reviewed and are negative. Except as noted above the remainder of the review of systems was reviewed and negative.        PAST MEDICAL HISTORY     Past Medical History:   Diagnosis Date    Chronic back pain     History of substance abuse ([facility] [address]     meth, opiates, sober since 2019    Hypertension     Migraine     Osteomyelitis of left foot ([facility] [address]     2019    Pneumonia     PTSD (post-traumatic stress disorder)     per psychiatry history    Suicidal ideation 06/2020    history of    Tendonitis     left wrist    Toe fracture, right     2019         SURGICAL HISTORY       Past Surgical History:   Procedure Laterality Date    EYE SURGERY      \"lazy eye\"    FRACTURE SURGERY Left 12/12/2017    orif- Bosch's fracture    NASAL SEPTUM SURGERY      NOSE SURGERY  2014    OTHER SURGICAL HISTORY Left 2017    thumb, fracture ,         CURRENT MEDICATIONS       Previous Medications    SUMATRIPTAN (IMITREX) 50 MG TABLET    Take 1 tablet by mouth as needed       ALLERGIES     Amoxicillin, Topamax [topiramate], Metoprolol, Vicodin [hydrocodone-acetaminophen], and Moxifloxacin    FAMILY HISTORY       Family History   Problem Relation Age of Onset    Heart Failure Mother     Heart Disease Mother           SOCIAL HISTORY       Social History     Socioeconomic History    Marital status: Single     Spouse name: None    Number of children: 0    Years of education: None    Highest education level: None   Occupational History    Occupation: unemployed   Tobacco Use    Smoking status: Every Day     Packs/day: 1.00     Years: 28.00     Pack years: 28.00     Types: Cigarettes    Smokeless tobacco: Never    Tobacco comments:     not ready   Vaping Use    Vaping Use: Some days    Substances: THC, CBD, Medical marijuanna card   Substance and Sexual Activity    Alcohol use: No    Drug use: Not Currently     Types: Marijuana ([place]), Methamphetamines (Crystal Meth) Comment: 2019, sober       SCREENINGS    Ai Coma Scale  Eye Opening: Spontaneous  Best Verbal Response: Oriented  Best Motor Response: Obeys commands  Boykin Coma Scale Score: 15          PHYSICAL EXAM    (up to 7 for level 4, 8 or more for level 5)     ED Triage Vitals   BP Temp Temp Source Heart Rate Resp SpO2 Height Weight   11/16/22 1432 11/16/22 1427 11/16/22 1427 11/16/22 1427 11/16/22 1427 11/16/22 1427 11/16/22 1427 11/16/22 1427   (!) 180/102 97.7 °F (36.5 °C) Oral 65 16 100 % 5' 11\" (1.803 m) 200 lb (90.7 kg)       Physical Exam  Vitals and nursing note reviewed. Constitutional:       General: He is not in acute distress. Appearance: Normal appearance. He is well-developed. He is not diaphoretic. HENT:      Head: Normocephalic. Right Ear: Ear canal and external ear normal.      Left Ear: Ear canal and external ear normal.      Nose: Nose normal.      Mouth/Throat:      Mouth: Mucous membranes are moist.   Eyes:      Conjunctiva/sclera: Conjunctivae normal.      Pupils: Pupils are equal, round, and reactive to light. Neck:      Thyroid: No thyromegaly. Cardiovascular:      Rate and Rhythm: Normal rate and regular rhythm. Pulses: Normal pulses. Heart sounds: Normal heart sounds. No murmur heard. No friction rub. No gallop. Pulmonary:      Effort: Pulmonary effort is normal. No respiratory distress. Breath sounds: Normal breath sounds. No wheezing, rhonchi or rales. Chest:      Chest wall: No tenderness. Abdominal:      General: Bowel sounds are normal. There is no distension. Palpations: Abdomen is soft. Tenderness: There is no abdominal tenderness. Musculoskeletal:         General: Normal range of motion. Cervical back: Normal range of motion and neck supple. Skin:     General: Skin is warm. Neurological:      General: No focal deficit present. Mental Status: He is alert and oriented to person, place, and time.       GCS: GCS eye subscore is 4. GCS verbal subscore is 5. GCS motor subscore is 6. Cranial Nerves: No cranial nerve deficit. Sensory: No sensory deficit. Motor: No abnormal muscle tone.       Coordination: Coordination normal.      Deep Tendon Reflexes: Reflexes normal.   Psychiatric:         Behavior: Behavior normal.       DIAGNOSTIC RESULTS     EKG: All EKG's are interpreted by the Emergency Department Physician who either signs or Co-signs this chart in the absence of a cardiologist.        RADIOLOGY:   Non-plain film images such as CT, Ultrasound and MRI are read by the radiologist. Rach Ramus radiographic images are visualized and preliminarily interpreted by the emergency physician with the below findings:        Interpretation per the Radiologist below, if available at the time of this note:    No orders to display           LABS:  Results for orders placed or performed during the hospital encounter of 11/16/22   Strep Screen Group A Throat    Specimen: Throat   Result Value Ref Range    Rapid Strep A Screen Negative Negative   COVID-19, Rapid    Specimen: Nasopharyngeal Swab   Result Value Ref Range    SARS-CoV-2, NAAT DETECTED (A) Not Detected   Rapid influenza A/B antigens    Specimen: Nasopharyngeal   Result Value Ref Range    Rapid Influenza A Ag Negative Negative    Rapid Influenza B Ag Negative Negative            EMERGENCY DEPARTMENT COURSE and DIFFERENTIAL DIAGNOSIS/MDM:     Vitals:    11/16/22 1427 11/16/22 1432   BP:  (!) 180/102   Pulse: 65    Resp: 16    Temp: 97.7 °F (36.5 °C)    TempSrc: Oral    SpO2: 100%    Weight: 200 lb (90.7 kg)    Height: 5' 11\" (1.803 m)            MDM        REASSESSMENT          CRITICAL CARE TIME     CONSULTS:  None      PROCEDURES:     Procedures    MEDICATIONS GIVEN THIS VISIT:  Medications - No data to display     FINAL IMPRESSION      1. COVID-19            DISPOSITION/PLAN   DISPOSITION Decision To Discharge 11/16/2022 04:20:27 PM      PATIENT REFERRED TO:  [facility] [address]  [phone]    If symptoms worsen      DISCHARGE MEDICATIONS:  New Prescriptions    No medications on file       Controlled Substances Monitoring  RX Monitoring 12/4/2017   Attestation The Prescription Monitoring Report for this patient was reviewed today. Periodic Controlled Substance Monitoring -           (Please note that portions of this note were completed with a voice recognition program.  Efforts were made to edit the dictations but occasionally words are mis-transcribed.)    Patient was advised to return to the Emergency Department if there was any worsening.     Trupti Zepeda MD (electronically signed)  Attending Emergency Physician         Bishop Jose Maria MD  11/16/22 [id number]

## 2022-11-16 NOTE — ED NOTES
Reviewed patient discharge instructions at this time, copy given to patient. No questions or concerns. Patient voiced understanding.         Shae Ellsworth RN  11/16/22 [id number]

## 2022-11-16 NOTE — DISCHARGE INSTRUCTIONS
Take Tylenol 650 mg every 4 hours  Take ibuprofen 600 mg 3 times a day  Drink lots and lots of fluids  Get plenty of rest  Quarantine yourself for the next 5 to 10 days depending on how you do  Return if any shortness of breath

## 2022-11-16 NOTE — Clinical Note
Savannah Perez was seen and treated in our emergency department on 11/16/2022. He may return to work on 11/23/2022. May need anywhere between 5 to 10 days off. If he is feeling better in 5 days he may return but he has to wear a mask for 5 days     If you have any questions or concerns, please don't hesitate to call.       Abbey Mccabe MD

## 2022-11-19 LAB — S PYO THROAT QL CULT: NORMAL
